# Patient Record
Sex: MALE | Race: WHITE | NOT HISPANIC OR LATINO | Employment: FULL TIME | URBAN - METROPOLITAN AREA
[De-identification: names, ages, dates, MRNs, and addresses within clinical notes are randomized per-mention and may not be internally consistent; named-entity substitution may affect disease eponyms.]

---

## 2017-04-25 DIAGNOSIS — F51.01 PRIMARY INSOMNIA: ICD-10-CM

## 2017-04-25 NOTE — TELEPHONE ENCOUNTER
CLONIDINE 0.3MG      Last Written Prescription Date: 11/21/2016  Last Fill Quantity: 30, # refills: 5  Last Office Visit with [facility], [facility] or [facility] prescribing provider: 7/5/2016       Potassium   Date Value Ref Range Status   12/04/2009 4.6 3.4 - 5.3 mmol/L Final     Creatinine   Date Value Ref Range Status   12/04/2009 0.68 0.39 - 0.73 mg/dL Final     Comment:     New IDMS-traceable calibration  beginning 5/1/08     BP Readings from Last 3 Encounters:   07/05/16 112/73   10/05/12 121/67   06/29/12 120/76

## 2017-04-26 RX ORDER — CLONIDINE HYDROCHLORIDE 0.3 MG/1
TABLET ORAL
Qty: 30 TABLET | Refills: 2 | Status: SHIPPED | OUTPATIENT
Start: 2017-04-26 | End: 2017-07-24

## 2017-04-26 NOTE — TELEPHONE ENCOUNTER
Prescription approved per [facility] Refill Protocol.  Due for annual visit in July.  Christina Gil, RN  Triage Nurse

## 2017-08-23 DIAGNOSIS — F51.01 PRIMARY INSOMNIA: ICD-10-CM

## 2017-08-23 NOTE — TELEPHONE ENCOUNTER
cloNIDine (CATAPRES) 0.3 MG tablet      Last Written Prescription Date: 7/27/2017  Last Fill Quantity: 30, # refills: 0  Last Office Visit with [facility], P or [facility] prescribing provider: 7/5/2016       Potassium   Date Value Ref Range Status   12/04/2009 4.6 3.4 - 5.3 mmol/L Final     Creatinine   Date Value Ref Range Status   12/04/2009 0.68 0.39 - 0.73 mg/dL Final     Comment:     New IDMS-traceable calibration  beginning 5/1/08     BP Readings from Last 3 Encounters:   07/05/16 112/73   10/05/12 121/67   06/29/12 120/76

## 2017-08-24 RX ORDER — CLONIDINE HYDROCHLORIDE 0.3 MG/1
TABLET ORAL
Qty: 30 TABLET | Refills: 0 | Status: SHIPPED | OUTPATIENT
Start: 2017-08-24 | End: 2017-09-20

## 2017-08-24 NOTE — TELEPHONE ENCOUNTER
Routing refill request to provider for review/approval because:  Tisha given x1 and patient did not follow up, please advise  Labs not current:  creatinine  Patient needs to be seen because it has been more than 1 year since last office visit.    Yuko Patel RN

## 2017-09-20 DIAGNOSIS — F51.01 PRIMARY INSOMNIA: ICD-10-CM

## 2017-09-21 NOTE — TELEPHONE ENCOUNTER
cloNIDine (CATAPRES) 0.3 MG tablet      Last Written Prescription Date: 8/24/2017  Last Fill Quantity: 30, # refills: 0  Last Office Visit with G, P or [facility] prescribing provider: 7/5/2016       Potassium   Date Value Ref Range Status   12/04/2009 4.6 3.4 - 5.3 mmol/L Final     Creatinine   Date Value Ref Range Status   12/04/2009 0.68 0.39 - 0.73 mg/dL Final     Comment:     New IDMS-traceable calibration  beginning 5/1/08     BP Readings from Last 3 Encounters:   07/05/16 112/73   10/05/12 121/67   06/29/12 120/76

## 2017-09-22 RX ORDER — CLONIDINE HYDROCHLORIDE 0.3 MG/1
TABLET ORAL
Qty: 14 TABLET | Refills: 0 | Status: SHIPPED | OUTPATIENT
Start: 2017-09-22 | End: 2017-09-26

## 2017-09-22 NOTE — TELEPHONE ENCOUNTER
Routing to provider - patient was given one month laura RX last month. No follow up scheduled as yet.    Milly Jacobson, MSN, RN-BC  Care Coordinator

## 2017-09-22 NOTE — TELEPHONE ENCOUNTER
LM for patient to call the clinic.  Please see below documentation.    Thank you,    Jenny Pickering

## 2017-09-22 NOTE — TELEPHONE ENCOUNTER
Patient given 2 week supply. Needs appointment with Dr. Dong.   Please call and inform. And schedule!   Alexanrda Guerrero PA-C

## 2017-09-26 ENCOUNTER — OFFICE VISIT (OUTPATIENT)
Dept: PEDIATRICS | Facility: CLINIC | Age: 22
End: 2017-09-26
Payer: COMMERCIAL

## 2017-09-26 VITALS
DIASTOLIC BLOOD PRESSURE: 62 MMHG | BODY MASS INDEX: 18.52 KG/M2 | TEMPERATURE: 98.6 F | SYSTOLIC BLOOD PRESSURE: 128 MMHG | OXYGEN SATURATION: 97 % | HEART RATE: 66 BPM | HEIGHT: 67 IN | WEIGHT: 118 LBS

## 2017-09-26 DIAGNOSIS — F90.2 ADHD (ATTENTION DEFICIT HYPERACTIVITY DISORDER), COMBINED TYPE: ICD-10-CM

## 2017-09-26 DIAGNOSIS — Z23 NEED FOR TDAP VACCINATION: ICD-10-CM

## 2017-09-26 DIAGNOSIS — F51.01 PRIMARY INSOMNIA: Primary | ICD-10-CM

## 2017-09-26 DIAGNOSIS — Z23 NEED FOR PROPHYLACTIC VACCINATION AND INOCULATION AGAINST INFLUENZA: ICD-10-CM

## 2017-09-26 PROCEDURE — 90715 TDAP VACCINE 7 YRS/> IM: CPT | Performed by: INTERNAL MEDICINE

## 2017-09-26 PROCEDURE — 90471 IMMUNIZATION ADMIN: CPT | Performed by: INTERNAL MEDICINE

## 2017-09-26 PROCEDURE — 90686 IIV4 VACC NO PRSV 0.5 ML IM: CPT | Performed by: INTERNAL MEDICINE

## 2017-09-26 PROCEDURE — 99213 OFFICE O/P EST LOW 20 MIN: CPT | Mod: 25 | Performed by: INTERNAL MEDICINE

## 2017-09-26 PROCEDURE — 90472 IMMUNIZATION ADMIN EACH ADD: CPT | Performed by: INTERNAL MEDICINE

## 2017-09-26 RX ORDER — CLONIDINE HYDROCHLORIDE 0.3 MG/1
0.3 TABLET ORAL DAILY
Qty: 30 TABLET | Refills: 11 | Status: SHIPPED | OUTPATIENT
Start: 2017-09-26 | End: 2018-08-17

## 2017-09-26 NOTE — MR AVS SNAPSHOT
"              After Visit Summary   9/26/2017    Alo Gallegos    MRN: [id number]           Patient Information     Date Of Birth          1995        Visit Information        Provider Department      9/26/2017 4:10 PM Hernan Dong MD [facility]        Today's Diagnoses     Primary insomnia    -  1    ADHD (attention deficit hyperactivity disorder), combined type        Need for Tdap vaccination        Need for prophylactic vaccination and inoculation against influenza          Care Instructions    1) Refilled the clonidine for 1 year    2) Flu and tetanus vaccines today    Let me know if other issues or concerns come up.    Hernan Dong MD          Follow-ups after your visit        Who to contact     If you have questions or need follow up information about today's clinic visit or your schedule please contact [facility] directly at [phone].  Normal or non-critical lab and imaging results will be communicated to you by MyChart, letter or phone within 4 business days after the clinic has received the results. If you do not hear from us within 7 days, please contact the clinic through MyChart or phone. If you have a critical or abnormal lab result, we will notify you by phone as soon as possible.  Submit refill requests through Mettl or call your pharmacy and they will forward the refill request to us. Please allow 3 business days for your refill to be completed.          Additional Information About Your Visit        Y-Klubhart Information     Mettl lets you send messages to your doctor, view your test results, renew your prescriptions, schedule appointments and more. To sign up, go to [email or web address] . Click on \"Log in\" on the left side of the screen, which will take you to the Welcome page. Then click on \"Sign up Now\" on the right side of the page.     You will be asked to enter the access code listed below, as well as some personal information. Please follow the " "directions to create your username and password.     Your access code is: [email or web address]  Expires: 2017  4:26 PM     Your access code will  in 90 days. If you need help or a new code, please call your [place] clinic or [phone].        Care EveryWhere ID     This is your Care EveryWhere ID. This could be used by other organizations to access your [place] medical records  [id number]        Your Vitals Were     Pulse Temperature Height Pulse Oximetry BMI (Body Mass Index)       66 98.6  F (37  C) (Oral) 5' 6.5\" (1.689 m) 97% 18.76 kg/m2        Blood Pressure from Last 3 Encounters:   17 128/62   16 112/73   10/05/12 121/67    Weight from Last 3 Encounters:   17 118 lb (53.5 kg)   16 119 lb (54 kg)   10/05/12 129 lb (58.5 kg) (23 %)*     * Growth percentiles are based on [facility] 2-20 Years data.              We Performed the Following     ADMIN 1st VACCINE     FLU VAC, SPLIT VIRUS IM > 3 YO (QUADRIVALENT) [31865]     TDAP VACCINE (ADACEL)     Vaccine Administration, Each Additional [23023]          Today's Medication Changes          These changes are accurate as of: 17  4:26 PM.  If you have any questions, ask your nurse or doctor.               These medicines have changed or have updated prescriptions.        Dose/Directions    cloNIDine 0.3 MG tablet   Commonly known as:  CATAPRES   This may have changed:  See the new instructions.   Used for:  Primary insomnia, ADHD (attention deficit hyperactivity disorder), combined type   Changed by:  Hernan Dong MD        Dose:  0.3 mg   Take 1 tablet (0.3 mg) by mouth daily   Quantity:  30 tablet   Refills:  11            Where to get your medicines      These medications were sent to [facility] - [place], [place] - [address] SEC OF  [address], [facility] [address]     Phone:  [phone]     cloNIDine 0.3 MG tablet                Primary Care Provider Office " Phone # Fax #    Hernan Dong [phone] [address] [place] [address]        Equal Access to Services     [facility] MARINO : Hadii aad ku hadprashantsaúl Morenitaernie, walaloda carlosaman, qasalbadorta kadharada nabil, michael lulavelino tian lamirianjesús eleazar. So [facility] [phone].    ATENCIÓN: Si habla español, tiene a may disposición servicios gratuitos de asistencia lingüística. Llame al [phone].    We comply with applicable federal civil rights laws and [place] laws. We do not discriminate on the basis of race, color, national origin, age, disability sex, sexual orientation or gender identity.            Thank you!     Thank you for choosing [facility]  for your care. Our goal is always to provide you with excellent care. Hearing back from our patients is one way we can continue to improve our services. Please take a few minutes to complete the written survey that you may receive in the mail after your visit with us. Thank you!             Your Updated Medication List - Protect others around you: Learn how to safely use, store and throw away your medicines at www.disposemymeds.org.          This list is accurate as of: 9/26/17  4:26 PM.  Always use your most recent med list.                   Brand Name Dispense Instructions for use Diagnosis    cloNIDine 0.3 MG tablet    CATAPRES    30 tablet    Take 1 tablet (0.3 mg) by mouth daily    Primary insomnia, ADHD (attention deficit hyperactivity disorder), combined type       MELATONIN      5 mg daily

## 2017-09-26 NOTE — PROGRESS NOTES
"  SUBJECTIVE:   Alo Gallegos is a 22 year old male who presents to clinic today for the following health issues:      Follow up from previous office visit     Recheck on the clonidine: has been taking every night and feels that helps well with sleeping and helps him be able to clear his mind and sleep. Feels well rested after sleeping. Mood doing well without concerns for anxiety or depression.     Helps with ADD. Working as a  right now. Feels more stressed with skill level, has been working more overtime. Feels that focus has been going well at work.  He feels that he is able to focus and get things completed. The current dose of clonidine is working well and he has no desires to change it.    No chest pains or pressures. No shortness of breath. No palpitations. No LH or dizziness.     Feels that this has been working well and does not wish to change this.    Problem list and histories reviewed & adjusted, as indicated.  Additional history: as documented    Patient Active Problem List   Diagnosis     Primary insomnia     ADHD (attention deficit hyperactivity disorder), combined type     History reviewed. No pertinent surgical history.    Social History   Substance Use Topics     Smoking status: Never Smoker     Smokeless tobacco: Never Used     Alcohol use Yes      Comment: very little     History reviewed. No pertinent family history.          Reviewed and updated as needed this visit by clinical staff       Reviewed and updated as needed this visit by Provider         ROS:  Constitutional, HEENT, cardiovascular, pulmonary, GI, , musculoskeletal, neuro, skin, endocrine and psych systems are negative, except as otherwise noted.      OBJECTIVE:   /62 (BP Location: Right arm, Cuff Size: Adult Regular)  Pulse 66  Temp 98.6  F (37  C) (Oral)  Ht 5' 6.5\" (1.689 m)  Wt 118 lb (53.5 kg)  SpO2 97%  BMI 18.76 kg/m2  Body mass index is 18.76 kg/(m^2).  GENERAL: healthy, alert and no distress  NECK: no " adenopathy, no asymmetry, masses, or scars and thyroid normal to palpation  RESP: lungs clear to auscultation - no rales, rhonchi or wheezes  CV: regular rate and rhythm, normal S1 S2, no S3 or S4, no murmur, click or rub, no peripheral edema and peripheral pulses strong  MS: no gross musculoskeletal defects noted, no edema  SKIN: no suspicious lesions or rashes  NEURO: Normal strength and tone, mentation intact and speech normal  PSYCH: mentation appears normal, affect flat, judgement and insight intact and poor eye contact    Diagnostic Test Results:  none     ASSESSMENT/PLAN:     1. Primary insomnia  Will continue current therapy, discussed risks and benefits of continuing and also side effects to watch for.   - cloNIDine (CATAPRES) 0.3 MG tablet; Take 1 tablet (0.3 mg) by mouth daily  Dispense: 30 tablet; Refill: 11    2. ADHD (attention deficit hyperactivity disorder), combined type  Will continue current therapy as feels that this is working well. Discussed can wean off this in the future if desired  - cloNIDine (CATAPRES) 0.3 MG tablet; Take 1 tablet (0.3 mg) by mouth daily  Dispense: 30 tablet; Refill: 11    3. Need for Tdap vaccination  - TDAP VACCINE (ADACEL)  - ADMIN 1st VACCINE    4. Need for prophylactic vaccination and inoculation against influenza  - FLU VAC, SPLIT VIRUS IM > 3 YO (QUADRIVALENT) [73181]  - Vaccine Administration, Each Additional [42274]      Patient Instructions   1) Refilled the clonidine for 1 year    2) Flu and tetanus vaccines today    Let me know if other issues or concerns come up.    MD Hernan Hernandez MD, MD  [facility]    Injectable Influenza Immunization Documentation    1.  Is the person to be vaccinated sick today?   No    2. Does the person to be vaccinated have an allergy to a component   of the vaccine?   No    3. Has the person to be vaccinated ever had a serious reaction   to influenza vaccine in the past?   No    4. Has the person to  be vaccinated ever had Guillain-Barré syndrome?   No    Form completed by Chanelle Williamson MA

## 2017-09-26 NOTE — NURSING NOTE
"Chief Complaint   Patient presents with     RECHECK       Initial /62 (BP Location: Right arm, Cuff Size: Adult Regular)  Pulse 66  Temp 98.6  F (37  C) (Oral)  Ht 5' 6.5\" (1.689 m)  Wt 118 lb (53.5 kg)  SpO2 97%  BMI 18.76 kg/m2 Estimated body mass index is 18.76 kg/(m^2) as calculated from the following:    Height as of this encounter: 5' 6.5\" (1.689 m).    Weight as of this encounter: 118 lb (53.5 kg).  Medication Reconciliation: complete   Chanelle Williamson MA    "

## 2017-09-26 NOTE — PATIENT INSTRUCTIONS
1) Refilled the clonidine for 1 year    2) Flu and tetanus vaccines today    Let me know if other issues or concerns come up.    Hernan Dong MD

## 2018-08-17 ENCOUNTER — OFFICE VISIT (OUTPATIENT)
Dept: PEDIATRICS | Facility: CLINIC | Age: 23
End: 2018-08-17
Payer: COMMERCIAL

## 2018-08-17 VITALS
BODY MASS INDEX: 19.38 KG/M2 | WEIGHT: 123.5 LBS | SYSTOLIC BLOOD PRESSURE: 102 MMHG | OXYGEN SATURATION: 99 % | DIASTOLIC BLOOD PRESSURE: 56 MMHG | HEART RATE: 53 BPM | HEIGHT: 67 IN | TEMPERATURE: 96.6 F

## 2018-08-17 DIAGNOSIS — Z00.00 ROUTINE GENERAL MEDICAL EXAMINATION AT A HEALTH CARE FACILITY: Primary | ICD-10-CM

## 2018-08-17 DIAGNOSIS — M25.531 RIGHT WRIST PAIN: ICD-10-CM

## 2018-08-17 DIAGNOSIS — F90.2 ADHD (ATTENTION DEFICIT HYPERACTIVITY DISORDER), COMBINED TYPE: ICD-10-CM

## 2018-08-17 DIAGNOSIS — F51.01 PRIMARY INSOMNIA: ICD-10-CM

## 2018-08-17 PROCEDURE — 99395 PREV VISIT EST AGE 18-39: CPT | Performed by: INTERNAL MEDICINE

## 2018-08-17 RX ORDER — CLONIDINE HYDROCHLORIDE 0.3 MG/1
0.3 TABLET ORAL DAILY
Qty: 30 TABLET | Refills: 11 | Status: SHIPPED | OUTPATIENT
Start: 2018-08-17 | End: 2019-08-23

## 2018-08-17 ASSESSMENT — ENCOUNTER SYMPTOMS
ARTHRALGIAS: 1
FEVER: 0
HEMATOCHEZIA: 0
PALPITATIONS: 0
FREQUENCY: 0
JOINT SWELLING: 0
DIZZINESS: 0
SORE THROAT: 0
COUGH: 1
NAUSEA: 0
ABDOMINAL PAIN: 0
HEADACHES: 0
HEMATURIA: 0
CHILLS: 0
MYALGIAS: 1
DIARRHEA: 0
SHORTNESS OF BREATH: 0
HEARTBURN: 0
WEAKNESS: 0
NERVOUS/ANXIOUS: 1
EYE PAIN: 0
DYSURIA: 0
PARESTHESIAS: 0
CONSTIPATION: 0

## 2018-08-17 NOTE — PROGRESS NOTES
SUBJECTIVE:   CC: Alo Gallegos is an 23 year old male who presents for preventative health visit.     Physical   Annual:     Getting at least 3 servings of Calcium per day:  NO    Bi-annual eye exam:  NO    Dental care twice a year:  Yes    Sleep apnea or symptoms of sleep apnea:  None    Frequency of exercise:  None    Taking medications regularly:  Yes    Medication side effects:  None    Additional concerns today:  No        Musculoskeletal problem/pain      Duration: off and on for 2 months    Description  Location: right wrist    Intensity:  mild, moderate, severe    Accompanying signs and symptoms: none    History  Previous similar problem: no   Previous evaluation:  none    Precipitating or alleviating factors:  Trauma or overuse: YES- new job  Aggravating factors include: lifting and overuse    Therapies tried and outcome: rest/inactivity      Has been using clonidine at night for help with sleep still, working well, no acute issues.     Today's PHQ-2 Score:   PHQ-2 ( 1999 Pfizer) 8/17/2018   Q1: Little interest or pleasure in doing things 1   Q2: Feeling down, depressed or hopeless 0   PHQ-2 Score 1   Q1: Little interest or pleasure in doing things Several days   Q2: Feeling down, depressed or hopeless Not at all   PHQ-2 Score 1       Abuse: Current or Past(Physical, Sexual or Emotional)- No  Do you feel safe in your environment - Yes    Social History   Substance Use Topics     Smoking status: Never Smoker     Smokeless tobacco: Never Used     Alcohol use Yes      Comment: very little     No flowsheet data found.No flowsheet data found.    Last PSA: No results found for: PSA    Reviewed orders with patient. Reviewed health maintenance and updated orders accordingly - Yes  Labs reviewed in EPIC    Reviewed and updated as needed this visit by clinical staff         Reviewed and updated as needed this visit by Provider            Review of Systems   Constitutional: Negative for chills and fever.   HENT:  "Negative for congestion, ear pain, hearing loss and sore throat.    Eyes: Negative for pain and visual disturbance.   Respiratory: Positive for cough. Negative for shortness of breath.    Cardiovascular: Negative for chest pain, palpitations and peripheral edema.   Gastrointestinal: Negative for abdominal pain, constipation, diarrhea, heartburn, hematochezia and nausea.   Genitourinary: Negative for discharge, dysuria, frequency, genital sores, hematuria, impotence and urgency.   Musculoskeletal: Positive for arthralgias and myalgias. Negative for joint swelling.   Skin: Negative for rash.   Neurological: Negative for dizziness, weakness, headaches and paresthesias.   Psychiatric/Behavioral: Negative for mood changes. The patient is nervous/anxious.          OBJECTIVE:   /56 (BP Location: Right arm, Patient Position: Sitting, Cuff Size: Adult Regular)  Pulse 53  Temp 96.6  F (35.9  C) (Tympanic)  Ht 5' 6.5\" (1.689 m)  Wt 123 lb 8 oz (56 kg)  SpO2 99%  BMI 19.63 kg/m2    Physical Exam  GENERAL: healthy, alert and no distress  EYES: Eyes grossly normal to inspection, PERRL and conjunctivae and sclerae normal  HENT: ear canals and TM's normal, nose and mouth without ulcers or lesions  NECK: no adenopathy, no asymmetry, masses, or scars and thyroid normal to palpation  RESP: lungs clear to auscultation - no rales, rhonchi or wheezes  CV: regular rate and rhythm, normal S1 S2, no S3 or S4, no murmur, click or rub, no peripheral edema and peripheral pulses strong  ABDOMEN: soft, nontender, no hepatosplenomegaly, no masses and bowel sounds normal  MS: no gross musculoskeletal defects noted, no edema  SKIN: no suspicious lesions or rashes  NEURO: Normal strength and tone, mentation intact and speech normal    Diagnostic Test Results:  Results for orders placed or performed during the hospital encounter of 10/05/12   Igf binding protein 3   Result Value Ref Range    IGF Binding Protein3 6.1 3.0 - 8.2 ug/mL    IGF " "Binding Protein 3 SD Score 0.4    INS growth factor 1   Result Value Ref Range    Insulin Growth Factor 1 557 184 - 714 ng/mL    Insulin Growth Factor 1 SD Score 0.8        ASSESSMENT/PLAN:       ICD-10-CM    1. Routine general medical examination at a health care facility Z00.00    2. Primary insomnia F51.01 cloNIDine (CATAPRES) 0.3 MG tablet   3. ADHD (attention deficit hyperactivity disorder), combined type F90.2 cloNIDine (CATAPRES) 0.3 MG tablet   4. Right wrist pain M25.531        COUNSELING:   Reviewed preventive health counseling, as reflected in patient instructions    BP Readings from Last 1 Encounters:   09/26/17 128/62     Estimated body mass index is 19.63 kg/(m^2) as calculated from the following:    Height as of this encounter: 5' 6.5\" (1.689 m).    Weight as of this encounter: 123 lb 8 oz (56 kg).      Weight management plan: no acute weight concerns     reports that he has never smoked. He has never used smokeless tobacco.      Counseling Resources:  ATP IV Guidelines  Pooled Cohorts Equation Calculator  FRAX Risk Assessment  ICSI Preventive Guidelines  Dietary Guidelines for Americans, 2010  [employer]'s MyPlate  ASA Prophylaxis  Lung CA Screening    Hernan Dong MD, MD  [facility]  Answers for HPI/ROS submitted by the patient on 8/17/2018   PHQ-2 Score: 1    "

## 2018-08-17 NOTE — MR AVS SNAPSHOT
After Visit Summary   8/17/2018    Alo Gallegos    MRN: [id number]           Patient Information     Date Of Birth          1995        Visit Information        Provider Department      8/17/2018 11:30 AM Hernan Dong MD [facility]        Today's Diagnoses     Primary insomnia        ADHD (attention deficit hyperactivity disorder), combined type          Care Instructions    1) Try a wrist brace if needed, ice as able. Let me know if getting worse. Can try ibuprofen as well for pain.    2) Refilled the clonidine    3) We should check cholesterol in the next several years.     Hernan Dong MD    Preventive Health Recommendations  Male Ages 21 - 25     Yearly exam:             See your health care provider every year in order to  o   Review health changes.   o   Discuss preventive care.    o   Review your medicines if your doctor has prescribed any.    You should be tested each year for STDs (sexually transmitted diseases).     Talk to your provider about cholesterol testing.      If you are at risk for diabetes, you should have a diabetes test (fasting glucose).    Shots: Get a flu shot each year. Get a tetanus shot every 10 years.     Nutrition:    Eat at least 5 servings of fruits and vegetables daily.     Eat whole-grain bread, whole-wheat pasta and brown rice instead of white grains and rice.     Get adequate calcium and Vitamin D.     Lifestyle    Exercise for at least 150 minutes a week (30 minutes a day, 5 days a week). This will help you control your weight and prevent disease.     Limit alcohol to one drink per day.     No smoking.     Wear sunscreen to prevent skin cancer.     See your dentist every six months for an exam and cleaning.             Follow-ups after your visit        Follow-up notes from your care team     Return in about 1 year (around 8/17/2019).      Who to contact     If you have questions or need follow up information about today's clinic visit or  "your schedule please contact [facility] directly at [phone].  Normal or non-critical lab and imaging results will be communicated to you by MyChart, letter or phone within 4 business days after the clinic has received the results. If you do not hear from us within 7 days, please contact the clinic through MyChart or phone. If you have a critical or abnormal lab result, we will notify you by phone as soon as possible.  Submit refill requests through [employer] App or call your pharmacy and they will forward the refill request to us. Please allow 3 business days for your refill to be completed.          Additional Information About Your Visit        Care EveryWhere ID     This is your Care EveryWhere ID. This could be used by other organizations to access your [place] medical records  [id number]        Your Vitals Were     Pulse Temperature Height Pulse Oximetry BMI (Body Mass Index)       53 96.6  F (35.9  C) (Tympanic) 5' 6.5\" (1.689 m) 99% 19.63 kg/m2        Blood Pressure from Last 3 Encounters:   08/17/18 102/56   09/26/17 128/62   07/05/16 112/73    Weight from Last 3 Encounters:   08/17/18 123 lb 8 oz (56 kg)   09/26/17 118 lb (53.5 kg)   07/05/16 119 lb (54 kg)              Today, you had the following     No orders found for display         Where to get your medicines      These medications were sent to [facility] - [facility] [address]  [address], [facility] [address]     Phone:  [phone]     cloNIDine 0.3 MG tablet          Primary Care Provider Office Phone # Fax #    Hernan Dong [phone] [address] [place] [address]        Equal Access to Services     [facility] MARINO : Robert Shook, walaloda luqadaha, qaybta kaalmada nabil, michael bush. So [facility] [phone].    ATENCIÓN: Si habla español, tiene a may disposición " servicios gratuitos de asistencia lingüística. Cristobal miner [phone].    We comply with applicable federal civil rights laws and [place] laws. We do not discriminate on the basis of race, color, national origin, age, disability, sex, sexual orientation, or gender identity.            Thank you!     Thank you for choosing [facility]  for your care. Our goal is always to provide you with excellent care. Hearing back from our patients is one way we can continue to improve our services. Please take a few minutes to complete the written survey that you may receive in the mail after your visit with us. Thank you!             Your Updated Medication List - Protect others around you: Learn how to safely use, store and throw away your medicines at www.disposemymeds.org.          This list is accurate as of 8/17/18 12:05 PM.  Always use your most recent med list.                   Brand Name Dispense Instructions for use Diagnosis    cloNIDine 0.3 MG tablet    CATAPRES    30 tablet    Take 1 tablet (0.3 mg) by mouth daily    Primary insomnia, ADHD (attention deficit hyperactivity disorder), combined type       MELATONIN      5 mg daily

## 2018-08-17 NOTE — PATIENT INSTRUCTIONS
1) Try a wrist brace if needed, ice as able. Let me know if getting worse. Can try ibuprofen as well for pain.    2) Refilled the clonidine    3) We should check cholesterol in the next several years.     Hernan Dong MD    Preventive Health Recommendations  Male Ages 21 - 25     Yearly exam:             See your health care provider every year in order to  o   Review health changes.   o   Discuss preventive care.    o   Review your medicines if your doctor has prescribed any.    You should be tested each year for STDs (sexually transmitted diseases).     Talk to your provider about cholesterol testing.      If you are at risk for diabetes, you should have a diabetes test (fasting glucose).    Shots: Get a flu shot each year. Get a tetanus shot every 10 years.     Nutrition:    Eat at least 5 servings of fruits and vegetables daily.     Eat whole-grain bread, whole-wheat pasta and brown rice instead of white grains and rice.     Get adequate calcium and Vitamin D.     Lifestyle    Exercise for at least 150 minutes a week (30 minutes a day, 5 days a week). This will help you control your weight and prevent disease.     Limit alcohol to one drink per day.     No smoking.     Wear sunscreen to prevent skin cancer.     See your dentist every six months for an exam and cleaning.

## 2019-08-23 ENCOUNTER — OFFICE VISIT (OUTPATIENT)
Dept: PEDIATRICS | Facility: CLINIC | Age: 24
End: 2019-08-23
Payer: COMMERCIAL

## 2019-08-23 VITALS
HEIGHT: 66 IN | TEMPERATURE: 98.3 F | BODY MASS INDEX: 19.27 KG/M2 | WEIGHT: 119.9 LBS | OXYGEN SATURATION: 98 % | HEART RATE: 66 BPM | DIASTOLIC BLOOD PRESSURE: 68 MMHG | SYSTOLIC BLOOD PRESSURE: 104 MMHG

## 2019-08-23 DIAGNOSIS — F51.01 PRIMARY INSOMNIA: ICD-10-CM

## 2019-08-23 DIAGNOSIS — F90.2 ADHD (ATTENTION DEFICIT HYPERACTIVITY DISORDER), COMBINED TYPE: ICD-10-CM

## 2019-08-23 DIAGNOSIS — Z00.00 ROUTINE GENERAL MEDICAL EXAMINATION AT A HEALTH CARE FACILITY: Primary | ICD-10-CM

## 2019-08-23 LAB
CHOLEST SERPL-MCNC: 151 MG/DL
GLUCOSE SERPL-MCNC: 96 MG/DL (ref 70–99)
HDLC SERPL-MCNC: 47 MG/DL
LDLC SERPL CALC-MCNC: 94 MG/DL
NONHDLC SERPL-MCNC: 104 MG/DL
TRIGL SERPL-MCNC: 52 MG/DL

## 2019-08-23 PROCEDURE — 36415 COLL VENOUS BLD VENIPUNCTURE: CPT | Performed by: INTERNAL MEDICINE

## 2019-08-23 PROCEDURE — 80061 LIPID PANEL: CPT | Performed by: INTERNAL MEDICINE

## 2019-08-23 PROCEDURE — 82947 ASSAY GLUCOSE BLOOD QUANT: CPT | Performed by: INTERNAL MEDICINE

## 2019-08-23 PROCEDURE — 99395 PREV VISIT EST AGE 18-39: CPT | Performed by: INTERNAL MEDICINE

## 2019-08-23 RX ORDER — CLONIDINE HYDROCHLORIDE 0.3 MG/1
0.3 TABLET ORAL DAILY
Qty: 30 TABLET | Refills: 11 | Status: SHIPPED | OUTPATIENT
Start: 2019-08-23 | End: 2020-09-16

## 2019-08-23 ASSESSMENT — ENCOUNTER SYMPTOMS
DIARRHEA: 0
HEADACHES: 0
ABDOMINAL PAIN: 0
NAUSEA: 0
FEVER: 0
HEMATOCHEZIA: 0
DYSURIA: 0
CHILLS: 0
NERVOUS/ANXIOUS: 0
PARESTHESIAS: 0
SORE THROAT: 0
CONSTIPATION: 0
HEMATURIA: 0
SHORTNESS OF BREATH: 0
MYALGIAS: 1
WEAKNESS: 0
HEARTBURN: 0
COUGH: 0
DIZZINESS: 0
FREQUENCY: 0
EYE PAIN: 0
ARTHRALGIAS: 0
JOINT SWELLING: 0
PALPITATIONS: 0

## 2019-08-23 ASSESSMENT — MIFFLIN-ST. JEOR: SCORE: 1476.61

## 2019-08-23 NOTE — LETTER
[facility]  [address] [address]                  [phone]   August 23, 2019    Alo Gallegos  [address] [address]      Dear Alo,    Here is a summary of your recent test results:    Your labs looked good without concerning findings.     Your test results are enclosed.      Please contact me if you have any questions.           Thank you very much for choosing [facility]    Best regards,    Hernan Dong MD        Results for orders placed or performed in visit on 08/23/19   Lipid panel reflex to direct LDL Fasting   Result Value Ref Range    Cholesterol 151 <200 mg/dL    Triglycerides 52 <150 mg/dL    HDL Cholesterol 47 >39 mg/dL    LDL Cholesterol Calculated 94 <100 mg/dL    Non HDL Cholesterol 104 <130 mg/dL   Glucose   Result Value Ref Range    Glucose 96 70 - 99 mg/dL

## 2019-08-23 NOTE — PROGRESS NOTES
SUBJECTIVE:   CC: Alo Gallegos is an 24 year old male who presents for preventative health visit.     Healthy Habits:     Getting at least 3 servings of Calcium per day:  NO    Bi-annual eye exam:  NO    Dental care twice a year:  Yes    Sleep apnea or symptoms of sleep apnea:  None    Diet:  Regular (no restrictions)    Frequency of exercise:  None    Duration of exercise:  N/A    Taking medications regularly:  Yes    Barriers to taking medications:  None    Medication side effects:  None    PHQ-2 Total Score: 0    Additional concerns today:  No    Doing well on clonidine at night, feels like still needs. Mood going well. Attention going well. Sleeping well on clonidine.    Today's PHQ-2 Score:   PHQ-2 ( 1999 Pfizer) 8/23/2019   Q1: Little interest or pleasure in doing things 0   Q2: Feeling down, depressed or hopeless 0   PHQ-2 Score 0   Q1: Little interest or pleasure in doing things Not at all   Q2: Feeling down, depressed or hopeless Not at all   PHQ-2 Score 0     Abuse: Current or Past(Physical, Sexual or Emotional)- No  Do you feel safe in your environment? Yes    Social History     Tobacco Use     Smoking status: Never Smoker     Smokeless tobacco: Never Used   Substance Use Topics     Alcohol use: Yes     Comment: very little      If you drink alcohol do you typically have >3 drinks per day or >7 drinks per week? Not applicable    Alcohol Use 8/23/2019   Prescreen: >3 drinks/day or >7 drinks/week? Not Applicable   Prescreen: >3 drinks/day or >7 drinks/week? -       Last PSA: No results found for: PSA    Reviewed orders with patient. Reviewed health maintenance and updated orders accordingly - Yes  Lab work is in process    Reviewed and updated as needed this visit by clinical staff         Reviewed and updated as needed this visit by Provider            Review of Systems   Constitutional: Negative for chills and fever.   HENT: Negative for congestion, ear pain, hearing loss and sore throat.    Eyes:  "Negative for pain and visual disturbance.   Respiratory: Negative for cough and shortness of breath.    Cardiovascular: Negative for chest pain, palpitations and peripheral edema.   Gastrointestinal: Negative for abdominal pain, constipation, diarrhea, heartburn, hematochezia and nausea.   Genitourinary: Negative for discharge, dysuria, frequency, genital sores, hematuria, impotence and urgency.   Musculoskeletal: Positive for myalgias. Negative for arthralgias and joint swelling.   Skin: Negative for rash.   Neurological: Negative for dizziness, weakness, headaches and paresthesias.   Psychiatric/Behavioral: Negative for mood changes. The patient is not nervous/anxious.        OBJECTIVE:   /68 (BP Location: Right arm, Patient Position: Sitting, Cuff Size: Adult Regular)   Pulse 66   Temp 98.3  F (36.8  C) (Oral)   Ht 1.676 m (5' 6\")   Wt 54.4 kg (119 lb 14.4 oz)   SpO2 98%   BMI 19.35 kg/m      Physical Exam  GENERAL: healthy, alert and no distress  EYES: Eyes grossly normal to inspection, PERRL and conjunctivae and sclerae normal  HENT: ear canals and TM's normal, nose and mouth without ulcers or lesions  NECK: no adenopathy, no asymmetry, masses, or scars and thyroid normal to palpation  RESP: lungs clear to auscultation - no rales, rhonchi or wheezes  CV: regular rate and rhythm, normal S1 S2, no S3 or S4, no murmur, click or rub, no peripheral edema and peripheral pulses strong  ABDOMEN: soft, nontender, no hepatosplenomegaly, no masses and bowel sounds normal  MS: no gross musculoskeletal defects noted, no edema  SKIN: no suspicious lesions or rashes  NEURO: Normal strength and tone, mentation intact and speech normal  PSYCH: mentation appears normal, affect normal/bright    Diagnostic Test Results:  Labs reviewed in Epic    ASSESSMENT/PLAN:       ICD-10-CM    1. Routine general medical examination at a health care facility Z00.00 Lipid panel reflex to direct LDL Fasting     Glucose   2. Primary " "insomnia F51.01 cloNIDine (CATAPRES) 0.3 MG tablet   3. ADHD (attention deficit hyperactivity disorder), combined type F90.2 cloNIDine (CATAPRES) 0.3 MG tablet       COUNSELING:   Reviewed preventive health counseling, as reflected in patient instructions    Estimated body mass index is 19.63 kg/m  as calculated from the following:    Height as of 8/17/18: 1.689 m (5' 6.5\").    Weight as of 8/17/18: 56 kg (123 lb 8 oz).          reports that he has never smoked. He has never used smokeless tobacco.      Counseling Resources:  ATP IV Guidelines  Pooled Cohorts Equation Calculator  FRAX Risk Assessment  ICSI Preventive Guidelines  Dietary Guidelines for Americans, 2010  USDA's MyPlate  ASA Prophylaxis  Lung CA Screening    Hernan Dong MD  [facility] [place]  "

## 2019-08-23 NOTE — PATIENT INSTRUCTIONS
1) Labs today as we discussed    2) Sent in clonidine for you as well    3) Can try vitamin D 1000 international units per day if muscle aches are worse.    Hernan Dong MD    Preventive Health Recommendations  Male Ages 21 - 25     Yearly exam:             See your health care provider every year in order to  o   Review health changes.   o   Discuss preventive care.    o   Review your medicines if your doctor has prescribed any.    You should be tested each year for STDs (sexually transmitted diseases).     Talk to your provider about cholesterol testing.      If you are at risk for diabetes, you should have a diabetes test (fasting glucose).    Shots: Get a flu shot each year. Get a tetanus shot every 10 years.     Nutrition:    Eat at least 5 servings of fruits and vegetables daily.     Eat whole-grain bread, whole-wheat pasta and brown rice instead of white grains and rice.     Get adequate calcium and Vitamin D.     Lifestyle    Exercise for at least 150 minutes a week (30 minutes a day, 5 days a week). This will help you control your weight and prevent disease.     Limit alcohol to one drink per day.     No smoking.     Wear sunscreen to prevent skin cancer.     See your dentist every six months for an exam and cleaning.

## 2020-08-20 NOTE — PROGRESS NOTES
SUBJECTIVE:   CC: Alo Gallegos is an 25 year old male who presents for preventative health visit.     Healthy Habits:    Getting at least 3 servings of Calcium per day:  Yes    Bi-annual eye exam:  Yes    Dental care twice a year:  Yes    Sleep apnea or symptoms of sleep apnea:  None    Diet:  Regular (no restrictions)    Frequency of exercise:  4-5 days/week    Duration of exercise:  Greater than 60 minutes    Taking medications regularly:  Yes    Barriers to taking medications:  None    Medication side effects:  None    PHQ-2 Total Score:    Additional concerns today:  No    Has been on clonidine for sleep for years  Previously prescribed by pediatrician before establishing at our clinic with Dr. Letitia Tadeo ADHD  Was medicated as a child, no longer  Works in tree trim[employer]  Active job  Denies chest pain or palpitations  Sexually active with only 1 partner  Declines STD screening    Had normal fasting labs August 2019        Today's PHQ-2 Score:   PHQ-2 ( 1999 Pfizer) 8/23/2019   Q1: Little interest or pleasure in doing things 0   Q2: Feeling down, depressed or hopeless 0   PHQ-2 Score 0   Q1: Little interest or pleasure in doing things Not at all   Q2: Feeling down, depressed or hopeless Not at all   PHQ-2 Score 0       Abuse: Current or Past(Physical, Sexual or Emotional)- No  Do you feel safe in your environment? Yes        Social History     Tobacco Use     Smoking status: Never Smoker     Smokeless tobacco: Never Used   Substance Use Topics     Alcohol use: Yes     Comment: very little     If you drink alcohol do you typically have >3 drinks per day or >7 drinks per week? No    Alcohol Use 8/23/2019   Prescreen: >3 drinks/day or >7 drinks/week? Not Applicable   Prescreen: >3 drinks/day or >7 drinks/week? -       Last PSA: No results found for: PSA    Reviewed orders with patient. Reviewed health maintenance and updated orders accordingly - Yes  Lab work is in process  Labs reviewed in EPIC  BP Readings from  Last 3 Encounters:   08/21/20 108/60   08/23/19 104/68   08/17/18 102/56    Wt Readings from Last 3 Encounters:   08/21/20 57.5 kg (126 lb 11.2 oz)   08/23/19 54.4 kg (119 lb 14.4 oz)   08/17/18 56 kg (123 lb 8 oz)                  Patient Active Problem List   Diagnosis     Primary insomnia     ADHD (attention deficit hyperactivity disorder), combined type     No past surgical history on file.    Social History     Tobacco Use     Smoking status: Never Smoker     Smokeless tobacco: Never Used   Substance Use Topics     Alcohol use: Yes     Frequency: Monthly or less     Drinks per session: 1 or 2     Comment: very little     Family History   Problem Relation Age of Onset     Colon Cancer No family hx of      Prostate Cancer No family hx of          Current Outpatient Medications   Medication Sig Dispense Refill     cloNIDine (CATAPRES) 0.3 MG tablet Take 1 tablet (0.3 mg) by mouth daily 30 tablet 11     MELATONIN 5 mg daily          Reviewed and updated as needed this visit by clinical staff         Reviewed and updated as needed this visit by Provider        Past Medical History:   Diagnosis Date     Asperger's disorder         Review of Systems  CONSTITUTIONAL: NEGATIVE for fever, chills, change in weight  INTEGUMENTARY/SKIN: NEGATIVE for worrisome rashes, moles or lesions  EYES: NEGATIVE for vision changes or irritation  ENT: NEGATIVE for ear, mouth and throat problems  RESP: NEGATIVE for significant cough or SOB  CV: NEGATIVE for chest pain, palpitations or peripheral edema  GI: NEGATIVE for nausea, abdominal pain, heartburn, or change in bowel habits   male: negative for dysuria, hematuria, decreased urinary stream, erectile dysfunction, urethral discharge  MUSCULOSKELETAL: NEGATIVE for significant arthralgias or myalgia  NEURO: NEGATIVE for weakness, dizziness or paresthesias  PSYCHIATRIC: NEGATIVE for changes in mood or affect    OBJECTIVE:   /60 (BP Location: Right arm, Patient Position: Chair, Cuff  "Size: Adult Regular)   Pulse 57   Temp 98.5  F (36.9  C) (Oral)   Resp 14   Ht 1.676 m (5' 6\")   Wt 57.5 kg (126 lb 11.2 oz)   SpO2 97%   BMI 20.45 kg/m      Physical Exam  GENERAL: healthy, alert and no distress  EYES: Eyes grossly normal to inspection, PERRL and conjunctivae and sclerae normal  HENT: ear canals and TM's normal, nose and mouth without ulcers or lesions  NECK: no adenopathy, no asymmetry, masses, or scars and thyroid normal to palpation  RESP: lungs clear to auscultation - no rales, rhonchi or wheezes  CV: regular rate and rhythm, normal S1 S2, no S3 or S4, no murmur, click or rub, no peripheral edema and peripheral pulses strong  ABDOMEN: soft, nontender, no hepatosplenomegaly, no masses and bowel sounds normal  MS: no gross musculoskeletal defects noted, no edema  SKIN: no suspicious lesions or rashes  NEURO: Normal strength and tone, mentation intact and speech normal  PSYCH: mentation appears normal, affect normal/bright    Diagnostic Test Results:  Labs reviewed in Epic    ASSESSMENT/PLAN:       ICD-10-CM    1. Routine general medical examination at a health care facility  Z00.00    2. Primary insomnia  F51.01    3. ADHD (attention deficit hyperactivity disorder), combined type  F90.2        COUNSELING:   Reviewed preventive health counseling, as reflected in patient instructions       Regular exercise       Healthy diet/nutrition       Immunizations    Vaccinated for: Hepatitis B          Estimated body mass index is 19.35 kg/m  as calculated from the following:    Height as of 8/23/19: 1.676 m (5' 6\").    Weight as of 8/23/19: 54.4 kg (119 lb 14.4 oz).          reports that he has never smoked. He has never used smokeless tobacco.      Counseling Resources:  ATP IV Guidelines  Pooled Cohorts Equation Calculator  FRAX Risk Assessment  ICSI Preventive Guidelines  Dietary Guidelines for Americans, 2010  USDA's MyPlate  ASA Prophylaxis  Lung CA Screening    Kristen Davis, APRN " [facility] [place]

## 2020-08-21 ENCOUNTER — OFFICE VISIT (OUTPATIENT)
Dept: PEDIATRICS | Facility: CLINIC | Age: 25
End: 2020-08-21
Payer: COMMERCIAL

## 2020-08-21 VITALS
HEART RATE: 57 BPM | DIASTOLIC BLOOD PRESSURE: 60 MMHG | RESPIRATION RATE: 14 BRPM | BODY MASS INDEX: 20.36 KG/M2 | HEIGHT: 66 IN | TEMPERATURE: 98.5 F | WEIGHT: 126.7 LBS | OXYGEN SATURATION: 97 % | SYSTOLIC BLOOD PRESSURE: 108 MMHG

## 2020-08-21 DIAGNOSIS — F51.01 PRIMARY INSOMNIA: ICD-10-CM

## 2020-08-21 DIAGNOSIS — Z00.00 ROUTINE GENERAL MEDICAL EXAMINATION AT A HEALTH CARE FACILITY: Primary | ICD-10-CM

## 2020-08-21 DIAGNOSIS — F90.2 ADHD (ATTENTION DEFICIT HYPERACTIVITY DISORDER), COMBINED TYPE: ICD-10-CM

## 2020-08-21 PROCEDURE — 99395 PREV VISIT EST AGE 18-39: CPT | Mod: 25 | Performed by: NURSE PRACTITIONER

## 2020-08-21 PROCEDURE — 90471 IMMUNIZATION ADMIN: CPT | Performed by: NURSE PRACTITIONER

## 2020-08-21 PROCEDURE — 90746 HEPB VACCINE 3 DOSE ADULT IM: CPT | Performed by: NURSE PRACTITIONER

## 2020-08-21 SDOH — HEALTH STABILITY: MENTAL HEALTH: HOW OFTEN DO YOU HAVE A DRINK CONTAINING ALCOHOL?: MONTHLY OR LESS

## 2020-08-21 SDOH — HEALTH STABILITY: MENTAL HEALTH: HOW MANY STANDARD DRINKS CONTAINING ALCOHOL DO YOU HAVE ON A TYPICAL DAY?: 1 OR 2

## 2020-08-21 ASSESSMENT — MIFFLIN-ST. JEOR: SCORE: 1502.46

## 2020-09-15 DIAGNOSIS — F51.01 PRIMARY INSOMNIA: ICD-10-CM

## 2020-09-15 DIAGNOSIS — F90.2 ADHD (ATTENTION DEFICIT HYPERACTIVITY DISORDER), COMBINED TYPE: ICD-10-CM

## 2020-09-16 RX ORDER — CLONIDINE HYDROCHLORIDE 0.3 MG/1
0.3 TABLET ORAL DAILY
Qty: 30 TABLET | Refills: 0 | Status: SHIPPED | OUTPATIENT
Start: 2020-09-16 | End: 2020-10-15

## 2020-09-16 NOTE — TELEPHONE ENCOUNTER
Routing refill request to provider for review/approval because:  Labs not current:  creatinine  Patient needs to be seen because it has been more than 1 year since last office visit.    Junie Coelho RN

## 2020-10-14 ENCOUNTER — TELEPHONE (OUTPATIENT)
Dept: PEDIATRICS | Facility: CLINIC | Age: 25
End: 2020-10-14

## 2020-10-14 DIAGNOSIS — F51.01 PRIMARY INSOMNIA: ICD-10-CM

## 2020-10-14 DIAGNOSIS — F90.2 ADHD (ATTENTION DEFICIT HYPERACTIVITY DISORDER), COMBINED TYPE: ICD-10-CM

## 2020-10-14 NOTE — TELEPHONE ENCOUNTER
Reason for Call:  Other prescription    Detailed comments: Patient is calling in regards to his clonidine medication. He says that it was supposed to be refilled for a year's worth, but it was only filled for one month's worth. He is calling to request the next 11 months refills please. He would also like someone to please contact him once this has been placed. Thank you.    Phone Number Patient can be reached at: Cell number on file:    Telephone Information:   Mobile [phone]     Best Time: Anytime    Can we leave a detailed message on this number? YES    Call taken on 10/14/2020 at 6:36 PM by Bishop Ortega

## 2020-10-15 RX ORDER — CLONIDINE HYDROCHLORIDE 0.3 MG/1
0.3 TABLET ORAL DAILY
Qty: 30 TABLET | Refills: 11 | Status: SHIPPED | OUTPATIENT
Start: 2020-10-15 | End: 2021-09-07

## 2020-10-15 NOTE — TELEPHONE ENCOUNTER
The pt is aware of the providers message and has no further questions at this time.  Gina Avitia on 10/15/2020 at 11:35 AM

## 2021-09-07 ENCOUNTER — OFFICE VISIT (OUTPATIENT)
Dept: PEDIATRICS | Facility: CLINIC | Age: 26
End: 2021-09-07
Payer: COMMERCIAL

## 2021-09-07 VITALS
TEMPERATURE: 98.3 F | DIASTOLIC BLOOD PRESSURE: 60 MMHG | BODY MASS INDEX: 20.2 KG/M2 | SYSTOLIC BLOOD PRESSURE: 110 MMHG | HEART RATE: 56 BPM | RESPIRATION RATE: 20 BRPM | WEIGHT: 125.7 LBS | HEIGHT: 66 IN

## 2021-09-07 DIAGNOSIS — F51.01 PRIMARY INSOMNIA: ICD-10-CM

## 2021-09-07 DIAGNOSIS — Z00.00 ROUTINE GENERAL MEDICAL EXAMINATION AT A HEALTH CARE FACILITY: Primary | ICD-10-CM

## 2021-09-07 DIAGNOSIS — F90.2 ADHD (ATTENTION DEFICIT HYPERACTIVITY DISORDER), COMBINED TYPE: ICD-10-CM

## 2021-09-07 DIAGNOSIS — Z83.2 FAMILY HISTORY OF FACTOR V DEFICIENCY: ICD-10-CM

## 2021-09-07 LAB
ANION GAP SERPL CALCULATED.3IONS-SCNC: 4 MMOL/L (ref 3–14)
BUN SERPL-MCNC: 12 MG/DL (ref 7–30)
CALCIUM SERPL-MCNC: 9 MG/DL (ref 8.5–10.1)
CHLORIDE BLD-SCNC: 106 MMOL/L (ref 94–109)
CHOLEST SERPL-MCNC: 158 MG/DL
CO2 SERPL-SCNC: 27 MMOL/L (ref 20–32)
CREAT SERPL-MCNC: 1.14 MG/DL (ref 0.66–1.25)
FACTOR V INTERPRETATION: ABNORMAL
FASTING STATUS PATIENT QL REPORTED: YES
GFR SERPL CREATININE-BSD FRML MDRD: 88 ML/MIN/1.73M2
GLUCOSE BLD-MCNC: 97 MG/DL (ref 70–99)
HDLC SERPL-MCNC: 45 MG/DL
LAB DIRECTOR COMMENTS: ABNORMAL
LAB DIRECTOR DISCLAIMER: ABNORMAL
LAB DIRECTOR INTERPRETATION: ABNORMAL
LAB DIRECTOR METHODOLOGY: ABNORMAL
LAB DIRECTOR RESULTS: ABNORMAL
LDLC SERPL CALC-MCNC: 97 MG/DL
MDL NUMBER: ABNORMAL
NONHDLC SERPL-MCNC: 113 MG/DL
POTASSIUM BLD-SCNC: 4.4 MMOL/L (ref 3.4–5.3)
SODIUM SERPL-SCNC: 137 MMOL/L (ref 133–144)
SPECIMEN DESCRIPTION: ABNORMAL
TRIGL SERPL-MCNC: 81 MG/DL

## 2021-09-07 PROCEDURE — 99395 PREV VISIT EST AGE 18-39: CPT | Mod: 25 | Performed by: NURSE PRACTITIONER

## 2021-09-07 PROCEDURE — 36415 COLL VENOUS BLD VENIPUNCTURE: CPT | Performed by: NURSE PRACTITIONER

## 2021-09-07 PROCEDURE — 90686 IIV4 VACC NO PRSV 0.5 ML IM: CPT | Performed by: NURSE PRACTITIONER

## 2021-09-07 PROCEDURE — 80061 LIPID PANEL: CPT | Performed by: NURSE PRACTITIONER

## 2021-09-07 PROCEDURE — 81241 F5 GENE: CPT | Performed by: NURSE PRACTITIONER

## 2021-09-07 PROCEDURE — 80048 BASIC METABOLIC PNL TOTAL CA: CPT | Performed by: NURSE PRACTITIONER

## 2021-09-07 PROCEDURE — 90471 IMMUNIZATION ADMIN: CPT | Performed by: NURSE PRACTITIONER

## 2021-09-07 PROCEDURE — G0452 MOLECULAR PATHOLOGY INTERPR: HCPCS | Performed by: PATHOLOGY

## 2021-09-07 RX ORDER — CLONIDINE HYDROCHLORIDE 0.3 MG/1
0.3 TABLET ORAL DAILY
Qty: 30 TABLET | Refills: 11 | Status: SHIPPED | OUTPATIENT
Start: 2021-09-07 | End: 2022-09-23

## 2021-09-07 ASSESSMENT — ENCOUNTER SYMPTOMS
COUGH: 0
SHORTNESS OF BREATH: 0
HEADACHES: 0
FREQUENCY: 0
PALPITATIONS: 0
ARTHRALGIAS: 1
NAUSEA: 0
HEMATOCHEZIA: 0
EYE PAIN: 0
MYALGIAS: 1
DIZZINESS: 0
HEARTBURN: 0
PARESTHESIAS: 0
HEMATURIA: 0
DYSURIA: 0
CONSTIPATION: 0
DIARRHEA: 0
NERVOUS/ANXIOUS: 0
JOINT SWELLING: 0
ABDOMINAL PAIN: 0
CHILLS: 0
SORE THROAT: 0
FEVER: 0
WEAKNESS: 0

## 2021-09-07 ASSESSMENT — MIFFLIN-ST. JEOR: SCORE: 1498.3

## 2021-09-07 NOTE — LETTER
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 That means you have one copy of the gene. That does carry a slight increased risk of blood clot, but you do not have other risk factors such as obesity, smoking history, etc. Approximately 5% of the population is heterozygous for Factor V.   It is important to know this, but no treatment is needed for this.     Your cholesterol looks great!     Glucose is normal.     Kristen Davis CNP           Resulted Orders   Basic metabolic panel  (Ca, Cl, CO2, Creat, Gluc, K, Na, BUN)   Result Value Ref Range    Sodium 137 133 - 144 mmol/L    Potassium 4.4 3.4 - 5.3 mmol/L    Chloride 106 94 - 109 mmol/L    Carbon Dioxide (CO2) 27 20 - 32 mmol/L    Anion Gap 4 3 - 14 mmol/L    Urea Nitrogen 12 7 - 30 mg/dL    Creatinine 1.14 0.66 - 1.25 mg/dL    Calcium 9.0 8.5 - 10.1 mg/dL    Glucose 97 70 - 99 mg/dL    GFR Estimate 88 >60 mL/min/1.73m2      Comment:      As of July 11, 2021, eGFR is calculated by the CKD-EPI creatinine equation, without race adjustment. eGFR can be influenced by muscle mass, exercise, and diet. The reported eGFR is an estimation only and is only applicable if the renal function is stable.   Lipid Profile (Chol, Trig, HDL, LDL calc)   Result Value Ref Range    Cholesterol 158 <200 mg/dL      Comment:      Age 0-19 years  Desirable: <170 mg/dL  Borderline high:  170-199 mg/dl  High:            >199 mg/dl    Age 20 years and older  Desirable: <200 mg/dL    Triglycerides 81 <150 mg/dL      Comment:      0-9 years:  Normal:    Less than 75 mg/dL  Borderline high:  75-99 mg/dL  High:             Greater than or equal to 100 mg/dL    0-19 years:  Normal:    Less than 90 mg/dL  Borderline high:   mg/dL  High:             Greater than or equal to 130 mg/dL    20 years and older:  Normal:    Less than 150 mg/dL  Borderline high:  150-199 mg/dL  High:              200-499 mg/dL  Very high:   Greater than or equal to 500 mg/dL    Direct Measure HDL 45 >=40 mg/dL      Comment:      0-19 years:       Greater than or equal to 45 mg/dL   Low: Less than 40 mg/dL   Borderline low: 40-44 mg/dL     20 years and older:   Female: Greater than or equal to 50 mg/dL   Male:   Greater than or equal to 40 mg/dL         LDL Cholesterol Calculated 97 <=100 mg/dL      Comment:      Age 0-19 years:  Desirable: 0-110 mg/dL   Borderline high: 110-129 mg/dL   High: >= 130 mg/dL    Age 20 years and older:  Desirable: <100mg/dL  Above desirable: 100-129 mg/dL   Borderline high: 130-159 mg/dL   High: 160-189 mg/dL   Very high: >= 190 mg/dL    Non HDL Cholesterol 113 <130 mg/dL      Comment:      0-19 years:  Desirable:          Less than 120 mg/dL  Borderline high:   120-144 mg/dL  High:                   Greater than or equal to 145 mg/dL    20 years and older:  Desirable:          130 mg/dL  Above Desirable: 130-159 mg/dL  Borderline high:   160-189 mg/dL  High:               190-219 mg/dL  Very high:     Greater than or equal to 220 mg/dL    Patient Fasting > 8hrs? Yes    Factor 5 leiden mutation analysis   Result Value Ref Range    RESULTS         Factor V 1691G>A (Leiden)  RESULTS:  Mutation analyzed: 1691G>A  Factor V 1691G>A (Leiden)  Interpretation:  PRESENT  Factor V 1691G>A (Leiden) mutation  genotype:  G/A        FACTOR V INTERPRETATION (A)      Factor V 1691G>A (Leiden)  Interpretation:  PRESENT      METHODOLOGY       The regions of genomic DNA containing the F5 gene mutation R506Q (1691G>A) and the Factor 2 (Prothrombin [id number]) gene mutation were simultaneously amplified using the polymerase chain reaction. The amplified products were digested with restriction endonuclease TaqI and products were analyzed by gel electrophoresis.      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 The presence of the F5  gene mutation R506Q (1691G>A) mutation is an indication of an increased risk of developing a thrombosis. The extent of this risk is dependent upon several other known thrombophilic risk factors including smoking, obesity and the use of oral contraceptives. Consultation regarding these results is available upon request. Genetic counseling regarding these results is indicated in the evaluation of other family members.      COMMENTS       If a patient is the recipient of an allogeneic bone marrow transplant, this test must be done on a pre-transplant sample or buccal swab.  A previous allogeneic bone marrow transplant will interfere with test results.  Call the [email or web address] ([phone]) for instructions on sample collection for these patients.      DISCLAIMER       This test was developed and its performance characteristics determined by [email or web address]. It has not been cleared or approved by the FDA. The laboratory is regulated under CLIA as qualified to perform high-complexity testing. This test is used for clinical purposes. It should not be regarded as investigational or for research.    A resident/fellow in an accredited training program was involved in the selection of testing, review of laboratory data, and/or interpretation of this case.  I, as the senior physician, attest that I: (i) confirmed appropriate testing, (ii) examined the relevant raw data for the specimen(s); and (iii) rendered or confirmed the interpretation(s).      [facility]       [id number]      Specimen Description       Blood: ACD         If you have any questions or concerns, please call the clinic at the number listed above.       Sincerely,      PRAFUL Sumner CNP

## 2021-09-07 NOTE — PROGRESS NOTES
SUBJECTIVE:   CC: Alo Gallegos is an 26 year old male who presents for preventative health visit.       Patient has been advised of split billing requirements and indicates understanding: Yes  Healthy Habits:     Getting at least 3 servings of Calcium per day:  NO    Bi-annual eye exam:  NO    Dental care twice a year:  Yes    Sleep apnea or symptoms of sleep apnea:  None    Diet:  Regular (no restrictions)    Frequency of exercise:  None    Taking medications regularly:  Yes    Barriers to taking medications:  None    Medication side effects:  Lightheadedness    PHQ-2 Total Score: 1    Additional concerns today:  No    On clonidine for insomnia for years  Suspects at least 10 years    Father has Factor V  Paternal aunt recently had a blood clot  Half sisters and other family members have Factor V and he would like to be tested  Non smoker    Would like a sleep study  Has been on clonidine for years  Doesn't sleep if he doesn't take it    Work has been really busy  [employer]      Today's PHQ-2 Score: 1  PHQ-2 ( 1999 Pfizer) 9/7/2021   Q1: Little interest or pleasure in doing things 0   Q2: Feeling down, depressed or hopeless 0   PHQ-2 Score 0   Q1: Little interest or pleasure in doing things Several days   Q2: Feeling down, depressed or hopeless Not at all   PHQ-2 Score 1       Abuse: Current or Past(Physical, Sexual or Emotional)- No  Do you feel safe in your environment? Yes    Have you ever done Advance Care Planning? (For example, a Health Directive, POLST, or a discussion with a medical provider or your loved ones about your wishes): No, advance care planning information given to patient to review.  Patient plans to discuss their wishes with loved ones or provider.      Social History     Tobacco Use     Smoking status: Never Smoker     Smokeless tobacco: Never Used   Substance Use Topics     Alcohol use: Yes     Comment: very little     If you drink alcohol do you typically have >3 drinks per day or  >7 drinks per week? Yes      Alcohol Use 8/21/2020   Prescreen: >3 drinks/day or >7 drinks/week? -   Prescreen: >3 drinks/day or >7 drinks/week? No   No flowsheet data found.    Last PSA: No results found for: PSA    Reviewed orders with patient. Reviewed health maintenance and updated orders accordingly - Yes  Lab work is in process  Labs reviewed in EPIC  BP Readings from Last 3 Encounters:   09/07/21 110/60   08/21/20 108/60   08/23/19 104/68    Wt Readings from Last 3 Encounters:   09/07/21 57 kg (125 lb 11.2 oz)   08/21/20 57.5 kg (126 lb 11.2 oz)   08/23/19 54.4 kg (119 lb 14.4 oz)                  Patient Active Problem List   Diagnosis     Primary insomnia     ADHD (attention deficit hyperactivity disorder), combined type     History reviewed. No pertinent surgical history.    Social History     Tobacco Use     Smoking status: Never Smoker     Smokeless tobacco: Never Used   Substance Use Topics     Alcohol use: Yes     Comment: very little     Family History   Problem Relation Age of Onset     Factor V Leiden deficiency Father      Deep Vein Thrombosis Paternal Aunt      Colon Cancer No family hx of      Prostate Cancer No family hx of          Current Outpatient Medications   Medication Sig Dispense Refill     cloNIDine (CATAPRES) 0.3 MG tablet Take 1 tablet (0.3 mg) by mouth daily 30 tablet 11     MELATONIN 5 mg daily          Reviewed and updated as needed this visit by clinical staff                 Reviewed and updated as needed this visit by Provider                Past Medical History:   Diagnosis Date     Asperger's disorder         Review of Systems   Constitutional: Negative for chills and fever.   HENT: Negative for congestion, ear pain, hearing loss and sore throat.    Eyes: Negative for pain and visual disturbance.   Respiratory: Negative for cough and shortness of breath.    Cardiovascular: Negative for chest pain, palpitations and peripheral edema.   Gastrointestinal: Negative for abdominal  "pain, constipation, diarrhea, heartburn, hematochezia and nausea.   Genitourinary: Negative for discharge, dysuria, frequency, genital sores, hematuria, impotence and urgency.   Musculoskeletal: Positive for arthralgias and myalgias. Negative for joint swelling.   Skin: Negative for rash.   Neurological: Negative for dizziness, weakness, headaches and paresthesias.   Psychiatric/Behavioral: Negative for mood changes. The patient is not nervous/anxious.        OBJECTIVE:   /60   Pulse 56   Temp 98.3  F (36.8  C) (Oral)   Resp 20   Ht 1.685 m (5' 6.34\")   Wt 57 kg (125 lb 11.2 oz)   BMI 20.08 kg/m      Physical Exam  GENERAL: healthy, alert and no distress  EYES: Eyes grossly normal to inspection, PERRL and conjunctivae and sclerae normal  HENT: ear canals and TM's normal, nose and mouth without ulcers or lesions  NECK: no adenopathy, no asymmetry, masses, or scars and thyroid normal to palpation  RESP: lungs clear to auscultation - no rales, rhonchi or wheezes  CV: regular rate and rhythm, normal S1 S2, no S3 or S4, no murmur, click or rub, no peripheral edema and peripheral pulses strong  ABDOMEN: soft, nontender, no hepatosplenomegaly, no masses and bowel sounds normal  MS: no gross musculoskeletal defects noted, no edema  SKIN: no suspicious lesions or rashes  NEURO: Normal strength and tone, mentation intact and speech normal  PSYCH: mentation appears normal, affect normal/bright    Diagnostic Test Results:  Labs reviewed in Epic    ASSESSMENT/PLAN:     Problem List Items Addressed This Visit        Behavioral    ADHD (attention deficit hyperactivity disorder), combined type       Other    Primary insomnia    Relevant Medications    cloNIDine (CATAPRES) 0.3 MG tablet    Other Relevant Orders    SLEEP EVALUATION & MANAGEMENT REFERRAL - ADULT -      Other Visit Diagnoses     Routine general medical examination at a health care facility    -  Primary    Relevant Orders    Basic metabolic panel  (Ca, Cl, " "CO2, Creat, Gluc, K, Na, BUN) (Completed)    Lipid Profile (Chol, Trig, HDL, LDL calc) (Completed)    Family history of factor V deficiency        Relevant Orders    Factor 5 leiden mutation analysis          Patient has been advised of split billing requirements and indicates understanding: Yes  COUNSELING:   Reviewed preventive health counseling, as reflected in patient instructions       Regular exercise       Healthy diet/nutrition       Immunizations    Declined: Covid        Estimated body mass index is 20.45 kg/m  as calculated from the following:    Height as of 8/21/20: 1.676 m (5' 6\").    Weight as of 8/21/20: 57.5 kg (126 lb 11.2 oz).         He reports that he has never smoked. He has never used smokeless tobacco.      Counseling Resources:  ATP IV Guidelines  Pooled Cohorts Equation Calculator  FRAX Risk Assessment  ICSI Preventive Guidelines  Dietary Guidelines for Americans, 2010  USDA's MyPlate  ASA Prophylaxis  Lung CA Screening    PRAFUL Lewis CNP  [facility]    "

## 2021-10-21 VITALS — BODY MASS INDEX: 19.29 KG/M2 | WEIGHT: 120 LBS | HEIGHT: 66 IN

## 2021-10-21 ASSESSMENT — MIFFLIN-ST. JEOR: SCORE: 1467.07

## 2021-10-21 NOTE — PATIENT INSTRUCTIONS
Insomnia and Behavioral Sleep Medicine Program    The [facility] Insomnia and Behavioral Sleep Medicine Program provides non-drug treatment for sleep problems including:      Cognitive-behavioral Therapies for Insomnia (CBT-I)    Management of Shift-work and Jet Lag    Management of Delayed, Advanced and Irregular Circadian Rhythm Sleep Disorders    Imagery Rehearsal Therapy (IRT) for Nightmare Disorder    PAP Therapy Desensitization    You have been referred for consultation with a sleep psychologist who specializes in behavioral sleep medicine and treatment of insomnia.  The [facility] Insomnia and Behavioral Sleep Medicine Program offers individualized telehealth services through our [facility] and online CBT-I.    Preparing for your Consultation:    You will need to keep a Sleep Diary for at least a week prior to your visit.  Simply download he CBTi- robert on your mobile devise and enter your sleep estimates in the My Sleep section each day.  You can also use the paper sleep diary provided. Your estimates should be your recollection of your last night's sleep.  We recommend you DO watch the clock or gather data during the night.      OR        Make sure to have your CBTI  Robert or paper sleep diary data available to review during consultation with your sleep provider.  You can also e-mail your sleep diary information to [email or web address] or fax it to [phone].    Frequently Asked Questions    What is CBT-I?    Cognitive Behavioral Therapy for Insomnia, also known as CBT-I, is a highly effective non-drug treatment for insomnia. The American College of Physicians recommends CBT-I as the first treatment for chronic insomnia.  Research has shown CBT-I to be safer and more effective long term than sleeping pills.    What does CBT-I involve?     CBT-I targets behaviors that lead to chronic insomnia:    Habits that weaken the bed as a cue for sleep    Habits that  weaken your body's sleep drive and sleep/wake clock     Unhelpful sleep thoughts that increase sleep-related worry and arousal.    The process works like a 4-6 session training program that provides you with the information and coaching needed to implement proven strategies to get a better night's sleep.    People often see improvement in their sleep within a few weeks. Research shows if you keep practicing the skills you learn your sleep is likely to continue to improve 6-12 months after treatment.    Does this program prescribe or manage sleep medication?    No.  Your prescribing provider is responsible to assist you in managing your sleep medications.  Some people choose to stop using sleep medication prior to or during CBT-I.  Our program can work with your prescribing provider to help reduce or eliminate use of sleep medications.     Can I Get Started Today?    Yes, you Can!  With our Online CBT-I program.     Research indicates that online CBT-I can be as effective as in-person therapy for motivated patients. It requires comfort with online learning and confidence that making changes in sleep habits can improve your sleep. Different from other online CBT-I programs, our insomnia program incorporates virtual visit follow-up. Our online CBT-I program is formatted for use on computers and mobile devices.  The cost for an entire 6 week program is $40.           Online CBT-I is not for everyone.  Contraindications include individuals with seizure disorders, bipolar disorder, unstable medical or mental health conditions, risk of falling, or are pregnant.    If you opt to try online CBT-I, we ask that you sign a consent for the [facility] to share your online CBT-I information with our sleep program.  To optimize care coordination, we ask that you also agree to share data from your daily sleep diaries. This can be done by entering the sharing code Cloud Your Car.    To get started, copy and past the link below which  will take you to the landing page to register:            [email or web address]                                 Are there any recommended self-help workbooks?    Yes!  We recommend you consider:    Say Priscilla to Insomnia:The Six-Week, Drug-Free Program Developed At [facility].  Anuj Matt MD. Available in paperback, Traci and audiobook.        Overcoming Insomnia: A Cognitive-Behavioral Therapy Approach, Workbook.  Delbert Bonilla, PhD  and Angelic Montiel, PhD.  Available in paperback and Traci.        Quiet Your Mind and Get to Sleep: Solutions to Insomnia for Those with Depression, Anxiety, or Chronic Pain.  Renee Collins, PhD and Angelic Montiel, PhD.  Available in paperback and Traci

## 2021-10-21 NOTE — PROGRESS NOTES
Alo is a 26 year old who is being evaluated via a billable video visit.      How would you like to obtain your AVS? MyChart  If the video visit is dropped, the invitation should be resent by: Text to cell phone: [phone]  Will anyone else be joining your video visit? No    Jessi Khanna CMA    Video-Visit Details    Type of service:  Video Visit    Video Start Time: 8:02AM    Video End Time:8:12AM    Originating Location (pt. Location): Home    Distant Location (provider location):  [facility]     Platform used for Video Visit: Unable to complete video visit - both Amwell and Doximity tried but poor connection. Visit transitioned to telephone only.     Telephone call start time: 8:12AM  Telephone call end time: 8:53AM    [facility]   Outpatient Sleep Medicine Consultation  Oct 22, 2021       Name: Alo Gallegos MRN# [id number]   Age: 26 year old YOB: 1995     Date of Consultation: Oct 22, 2021   Consultation is requested by: PRAFUL Sumner CNP  [address] [place] [address] Kristen Davis  Primary care provider: Kristen Davis         Assessment and Plan:   1. Chronic insomnia  Patient presents to clinic today for evaluation of chronic insomnia.  Patient reports that insomnia began early childhood.  He has been managing his insomnia with nightly clonidine and melatonin for a number of years. Primary goal of today's visit was to discuss alternative treatment options as he is either interested in getting off the medications altogether or potentially switching his medications up.  We discussed that the first-line treatment for chronic insomnia is actually cognitive behavioral therapy (CBT-I) and he was interested in referral to sleep psychology. Order was placed today.  We also briefly discussed the option of pursuing an overnight polysomnogram but ultimately agreed to hold off at this time.  He does  "report history of snoring (though this is not loud or socially disruptive) and male gender increases risk of sleep disordered breathing, but STOP-BANG still quite low and my suspicion for significant apnea is low as well. He also reports dream enactment behavior so RBD is a consideration but appears quite rare/sporadic and his history is otherwise atypical. Given that insomnia and desire to not require sleep aid to sleep is primary focus agreed to start with CBT-I but could consider PSG pending progress or if develops additional concerning symptoms. Educational materials provided in instructions.  - SLEEP PSYCHOLOGY REFERRAL; Future       Chief Complaint / Reason for Sleep Consult:     Chief Complaint   Patient presents with     Sleep Problem     Need meds to fall asleep          History of Present Illness:     Alo Gallegos is a 26 year old male who presents to the clinic for evaluation of insomnia.  Patient states the goal of today's visit is to discuss alternative treatment options to his current medication regimen for treating his insomnia which includes melatonin 5 mg and clonidine 0.3 mg.  Patient states that his insomnia and difficulties with sleep started in early childhood.  Patient states that he has been on the clonidine for \"close to 2 decades\".  Melatonin was added later.  Recalls that he was on medications prior to clonidine but does not recall names of them.  He feels that his current medications are helpful but not perfect.      He estimates that he is currently sleeping about 8 hours a night.  Sleep schedule is somewhat dependent on work schedule.  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  His current sleep schedule includes taking his melatonin and clonidine right after getting home from work which is typically between 6-8:00 PM.  Tries to get into bed around 8:00 PM with goal of sleeping by 9:00 PM estimates that most nights he is sleeping sometime between " "830-9:30 PM but can be as late as 11:00PM. Wakes for the day at 5:20AM for work. Denies difficulty waking in the morning, states \"I don't want to get up but its not hard I just use one alarm unlike other people\". On weekends, schedule is delayed a bit, takes medications at 9:00PM and is sleeping around 11:00PM and wakes at 9:00AM at the latest.     Patient reports that if he does not take his sleep aids he will be unable to fall asleep at all - \"I tried it a couple days in a row where I didn't take them and I was awake the entire time I just do not sleep\". When asked if he felt tired or sleepy on these days he responded \"I don't think I know what that feeling is without my medicine I don't know what a typical falling asleep feeling is\".     Denies any daytime napping - \"I honestly am unable to in the past 20 years I have taken one nap\". Denies inadvertent dozing. No drowsy driving. He had a total Philipp Sleepiness Scale of 0 (10/21/21 1100), with scores of 10 or higher indicating abnormal levels of sleepiness.    Unclear by history but possible restless legs syndrome symptoms that occur \"every once in a while\" while trying to fall asleep. No consistency and was unable to elaborate on how often but appears rare. He describes \"a feeling like I can't sit still and maybe a restlessness I suppose but if I had to wager a guess it's when I am hyperfocused on something from work and I get shifty at night thinking about it\". No kicking or leg movements in sleep.     Spends half his time at hailey's home and half his time at parents home. Hailey has mentioned \"once or twice\" that it appeared he acted out a dream. Patient states he is \"a  when I dream vividly\". Recalls coming out a dream punching the air before. Unclear number of times but appears sporadic - \"its nothing consistent just really randomly I guess\".     Hailey also notes snoring. Has snored since childhood but is not loud or socially disruptive - \" I am told " "that I snore yeah but never been told I can't be around you because of it or not like my dad he's loud\". No witnessed apneas. Denies gasp/choking arousals. Denies nocturnal GERD. No morning headaches.     Does admit to sleep talking. History of somnambulism x1 age 6-7.  No bruxism.     SCALES       SLEEP APNEA: Stopbang score  1-2/8       INSOMNIA:  Insomnia severity score: 22/28   absence of insomnia (0-7); sub-threshold insomnia (8-14); moderate insomnia (15-21); and severe insomnia (22-28)       SLEEPINESS: [place] sleepiness scale: 0/24 [normal < 11]          Medications:     Current Outpatient Medications   Medication Sig     cloNIDine (CATAPRES) 0.3 MG tablet Take 1 tablet (0.3 mg) by mouth daily     MELATONIN 5 mg daily      No current facility-administered medications for this visit.           Allergies:     Allergies   Allergen Reactions     Cats           Past Medical History:     Past Medical History:   Diagnosis Date     Asperger's disorder            Past Surgical History:    Previous upper airway surgery: denies  No past surgical history on file.         Social History:     Social History     Tobacco Use     Smoking status: Never Smoker     Smokeless tobacco: Never Used   Substance Use Topics     Alcohol use: Yes     Comment: very little     Chemical History:  Alcohol use: Couple times per year      Tobacco use: Denies    Illicit substances: Denies  Caffeine intake: Drinks 1 Monster energy drink at work. 1-2 cans of Mountain Dew on weekends. Does not cut off at any time, can take sips in middle of the night         Family History:     Family History   Problem Relation Age of Onset     Factor V Leiden deficiency Father      Deep Vein Thrombosis Paternal Aunt      Colon Cancer No family hx of      Prostate Cancer No family hx of       Sleep Family Hx: Mother with insomnia and father with snoring. Patient denies any known family history of sleep apnea, restless legs syndrome, narcolepsy or other sleep " "disorders.          Physical Examination:   Ht 1.676 m (5' 6\")   Wt 54.4 kg (120 lb)   BMI 19.37 kg/m    General: Cooperative and pleasant. In no apparent distress.  Pulmonary:  Able to speak easily in full sentences. No cough or wheeze.   Neurologic: Alert, oriented x3.   Psychiatric: Mood euthymic. Affect congruent with full range and intensity.         Data: All pertinent previous laboratory data reviewed     No results found for: PH, PHARTERIAL, PO2, [id number], SAT, PCO2, HCO3, BASEEXCESS, MAXINE, BEB  Lab Results   Component Value Date    TSH 0.75 09/09/2011    TSH 0.90 07/30/2010     Lab Results   Component Value Date    GLC 97 09/07/2021    GLC 96 08/23/2019     Lab Results   Component Value Date    HGB 14.0 12/04/2009     Lab Results   Component Value Date    BUN 12 09/07/2021    BUN 14 12/04/2009    CR 1.14 09/07/2021    CR 0.68 12/04/2009     Lab Results   Component Value Date    AST 29 12/04/2009    ALT 28 12/04/2009    ALKPHOS 188 12/04/2009    BILITOTAL 0.3 12/04/2009    BILICONJ 0.0 12/04/2009     No results found for: UAMP, UBARB, BENZODIAZEUR, UCANN, UCOC, OPIT, UPCP      Copy to: Kristen Davis PA-C  Oct 22, 2021     [facility]  [address] [place] [address]     [facility] [place]  [address] [place] [id number]    Chart documentation was completed, in part, with [employer]vers[employer] voice-recognition software. Even though reviewed, some grammatical, spelling, and word errors may remain.    >60 minutes spent on day of encounter doing chart review, history and exam, documentation, and further activities as noted above    "

## 2021-10-22 ENCOUNTER — VIRTUAL VISIT (OUTPATIENT)
Dept: SLEEP MEDICINE | Facility: CLINIC | Age: 26
End: 2021-10-22
Attending: NURSE PRACTITIONER
Payer: COMMERCIAL

## 2021-10-22 DIAGNOSIS — F51.04 CHRONIC INSOMNIA: Primary | ICD-10-CM

## 2021-10-22 PROCEDURE — 99205 OFFICE O/P NEW HI 60 MIN: CPT | Mod: 95 | Performed by: PHYSICIAN ASSISTANT

## 2022-01-05 ASSESSMENT — ENCOUNTER SYMPTOMS
MYALGIAS: 0
JOINT SWELLING: 0
MUSCLE CRAMPS: 1
ARTHRALGIAS: 0
DEPRESSION: 0
MUSCLE WEAKNESS: 0
STIFFNESS: 0
NERVOUS/ANXIOUS: 1
INSOMNIA: 1
BACK PAIN: 1
DECREASED CONCENTRATION: 0
PANIC: 0
NECK PAIN: 1

## 2022-01-05 ASSESSMENT — SLEEP AND FATIGUE QUESTIONNAIRES
HOW LIKELY ARE YOU TO NOD OFF OR FALL ASLEEP WHILE SITTING AND TALKING TO SOMEONE: WOULD NEVER DOZE
HOW LIKELY ARE YOU TO NOD OFF OR FALL ASLEEP WHILE WATCHING TV: WOULD NEVER DOZE
HOW LIKELY ARE YOU TO NOD OFF OR FALL ASLEEP WHILE SITTING AND READING: WOULD NEVER DOZE
HOW LIKELY ARE YOU TO NOD OFF OR FALL ASLEEP WHEN YOU ARE A PASSENGER IN A CAR FOR AN HOUR WITHOUT A BREAK: WOULD NEVER DOZE
HOW LIKELY ARE YOU TO NOD OFF OR FALL ASLEEP WHILE SITTING INACTIVE IN A PUBLIC PLACE: WOULD NEVER DOZE
HOW LIKELY ARE YOU TO NOD OFF OR FALL ASLEEP WHILE SITTING QUIETLY AFTER LUNCH WITHOUT ALCOHOL: WOULD NEVER DOZE
HOW LIKELY ARE YOU TO NOD OFF OR FALL ASLEEP WHILE LYING DOWN TO REST IN THE AFTERNOON WHEN CIRCUMSTANCES PERMIT: WOULD NEVER DOZE
HOW LIKELY ARE YOU TO NOD OFF OR FALL ASLEEP IN A CAR, WHILE STOPPED FOR A FEW MINUTES IN TRAFFIC: WOULD NEVER DOZE

## 2022-01-07 ENCOUNTER — VIRTUAL VISIT (OUTPATIENT)
Dept: SLEEP MEDICINE | Facility: CLINIC | Age: 27
End: 2022-01-07
Attending: PHYSICIAN ASSISTANT
Payer: COMMERCIAL

## 2022-01-07 VITALS — HEIGHT: 66 IN | WEIGHT: 121 LBS | BODY MASS INDEX: 19.44 KG/M2

## 2022-01-07 DIAGNOSIS — F51.04 CHRONIC INSOMNIA: ICD-10-CM

## 2022-01-07 PROCEDURE — 90791 PSYCH DIAGNOSTIC EVALUATION: CPT | Mod: 95 | Performed by: PSYCHOLOGIST

## 2022-01-07 ASSESSMENT — MIFFLIN-ST. JEOR: SCORE: 1471.6

## 2022-01-07 NOTE — PATIENT INSTRUCTIONS
1.  Towards her goal of having more time in the evening with all the effects of sedating medication, I recommend that you consult with a psychiatrist to review her medication and determine if there are alternatives for treatment of ADHD and for sleep if needed.    2.  Recommend you avoid using high doses melatonin beyond 10mg.  There is no consensus in the literature that doses higher than this her effective for insomnia.  The best evidence for use of melatonin in adults is at very low doses to help adjust sleep rhythm.  You may also want to consult with your physician appropriate use of this OTC supplement.    3.  Try to avoid sleeping on Jorge mornings so that you have enough time to wake up as he approaches Sunday night bedtime.  This may help you sleep more on Jorge night and get up to better start on Monday mornings.    4.  Try to avoid use of screens and devices in bed if possible.  I do understand that it may be helpful to have something distracting when getting into bed.  You may want to sit up in bed, read a book for a while or get out of bed and read before returning back to bed.  I am glad to see that you are trying to reduce these devices bluelight intensity.    5.  Ensure that you get exposure to bright light in the mornings can help promote earlier sleep onset.  Bright light exposure in the morning predicts when somebody will be able to fall asleep the next night.  Consider how to infuse your environment with adequate late in the mornings as you begin preparation to get ready for work.  1 way to do this is to use a bright light box, 10,000 Lux, full-spectrum, with UV protection for 30minutes.  Contraindications however any history of skin cancer, eye disease, bipolar disorder or seizure disorder.    Here is her basic insomnia behavioral module.            CBT-I Module - Sleep Consolidation Training      To prepare for Sleep Consolidation Training, we recommend you make the following changes to set  the stage for a better night's sleep:    ? Reduce your consumption of caffeine and alcohol.  Both can disrupt sleep and make strengthening your sleep more difficult.  Specifically:    - Avoid caffeine within 6 hours of bedtime   - No more than 3 caffeinated beverages per day (e.g. 8 oz. cup coffee or 12 oz. cup soda)           - No alcohol within 3 hours of bedtime    ? Make sure your bedroom is quiet, comfortable and dark.  Noise, light and an uncomfortable sleep space can harm your sleep.      Core Sleep Consolidation Strategies    Much like physical activity and eating nutritious foods strengthens our body, studies show that the following core strategies are the key to achieving stronger, healthier sleep.     1. Reduce your Time In Bed    Spending extra time in bed trying to get more sleep actually can cause more sleep disruption and weaken sleep efficiency. Sleep efficiency is simply the percentage of time a person spends asleep while in bed. Normal sleep efficiency is thought to be 85% or greater.  By reducing your time in bed, you will be awake longer leading to quicker and deeper sleep. This strategy does not reduce the amount of sleep you get, just the time you are awake in bed:                                             During the first step of sleep consolidation training you will reduce your time in bed and maintain the following Sleep Schedule Prescription. Your prescription is determined by the average nightly amount of sleep you got over the past two weeks plus 30 minutes. It also takes into account the best time for you to sleep. The least amount of time prescribed is 6 hours in bed.    Your Sleep Schedule Prescription                    Total Time in Bed:  8 hours                  Bedtime:  No earlier than 9 pm                   Wake-up Time:  Out of bed every day by 5:53 AM  With at least 30 minutes of bright light in the morning upon getting up    2.  Don't go to bed until you feel sleepy (not  tired or fatigued)     This helps increase your sleep drive by keeping you awake longer.  If you go to bed when you're not sleepy, it gives your brain the wrong message and can lead to frustration.     If you feel sleepy before your prescribed bedtime, find activities that can help you stay awake until it is time for you to go to bed. Even brief naps in the evening can be very disruptive of sleep at night.     3.  Don't stay in bed unless you are sleeping      If you are unable to fall asleep or return to sleep after about 30 minutes, get out of bed. This helps train your brain that the bed is for sleep. It is harmful to your sleep when you are worried or frustrated in bed. Do not read, eat, worry, think about sleep, use mobile phones or tablets, or watch TV in bed. Do not watch the clock during the night.     Go to another room and do something relaxing. Plan things you can do when you get out of bed. Avoid use of mobile devices or computers when out of bed.    Return to bed only when you feel sleepy again.  Repeat as often as needed throughout the night.     4.  Get out of bed at the same time every day    Getting up at the same time helps set your biological clock. It is important to stick to your wake time no matter how much sleep you got the night before or how you feel in the morning.    Varying your wake can have the same effect as jet lag.  It disrupts your biological clock and makes you feel more tired and exhausted.  Trying to  catch up  on sleep on the weekends only makes things worse and sets you up for a cycle of poor sleep the following weekdays.      Make sure you set an alarm and keep it away from the bed to prevent looking at the clock during the night.      5.  Avoid daytime napping    Avoid daytime napping if possible. Napping partially fulfills your need for sleep and weakens your sleep drive at night.    However, if you find yourself sleepy (not just tired) you can take a brief 15-30 minute nap  during the day if needed.  Naps within 7-8 hours of your prescribed wake time are less likely to affect your sleep the coming night.  Sleepy people make more mistakes and may hurt themselves or others. Therefore, safety trumps all other sleep prescriptions.  Never drive or operate equipment if drowsy or sleepy.

## 2022-01-07 NOTE — PROGRESS NOTES
Alo is a 26 year old who is being evaluated via a billable video visit.      How would you like to obtain your AVS? MyChart  If the video visit is dropped, the invitation should be resent by: Text to cell phone: [phone]  Will anyone else be joining your video visit? No    Video Start Time: 10:17 AM  Video-Visit Details    Type of service:  Video Visit    Video End Time:11:05 AM    Originating Location (pt. Location): Home    Distant Location (provider location):  [facility]     Platform used for Video Visit: [facility]     SLEEP MEDICINE CONSULTATION  Sleep Psychology    Name: Alo Gallegos MRN# [id number]   Age: 26 year old YOB: 1995     Date of Consultation: Jan 7, 2022  Consultation is requested by: Serena Franco PA-C  [address] [place] [address]  Primary care provider: Kristen Davis    Reason for Sleep Consultation     Alo Gallegos is a 26 year old male seen today for a behavioral sleep medicine consultation because of insomnia    Assessment and Plan     Sleep Diagnoses/Recommendations:    Chronic insomnia  Alo Gallegos was seen for sleep psychology consultation to address chronic, near lifelong history of insomnia associated with childhood diagnoses of Asperger's disorder and ADHD.    Recent sleep consultation phone no significant risk for sleep disordered breathing other intrinsic sleep disorder    Patient insomnia is likely multifactored with contribution lately from a very early work start time which requires him to get up at 5-5:30 AM.  On weekends he is sleeping in 2 what is likely more needed wake time of 830-9 AM.  This results in difficulty falling asleep on Sunday nights.  Additionally he is taking high doses of melatonin along with clonidine taken 2 hours before bedtime.  This is effects evening 3/social time because both of the medications sedative properties.    Patient was advised to talk to consulting  psychiatrist around his current prescription of clonidine and look at alternatives that may prevent evening side effects prior to his bedtime of 10 PM.  He was advised to maintain a consistent sleep schedule of 9 PM-5 AM during the workweek.  On weekends, twice daily would be to keep the same sleep schedule but for social/relationship needs he was advised to allow himself to stay Friday nights later and is sleeping on Saturday mornings.  On Jorge morning however, he was advised to get up early at 5:30 AM to allow for enough time to wait before Sunday night bedtime.  This may help result in better quality sleep on Sunday night before the beginning of the workweek.    Summary Counseling:      Alo was provided information about the pathophysiology of insomnia, psychophysiological factors contributing to the onset and maintenance of insomnia and how co-occurring medical conditions and intrinsic sleep disorders can affect sleep.  Treatment options were discussed including component of cognitive-behavioral therapy for insomnia as applicable to patient specific sleep concerns and symptoms. The benefits and potential early side effects of treatment including increased daytime sleepiness were discussed.     Patient was advised to consult with their prescribing provider around use of or changes to prescription sleep medication.  Patient was counseled on the importance of avoiding driving if drowsy.    See patient instructions for initial treatment recommendations and behavioral sleep plan.    Follow-up: as needed     History of Present Sleep Complaint     Alo Gallegos is a 26 year old year old male with a relevant medical history of  ADHD who presents with near life long history of insomnia.    He reports he has an undiagnosed issue of dizziness and light headedness which he usually experiences just after he eats.    Currently using melatonin between 10 mg to 20 mg.    Patient states that he takes clonidine at 7 PM.  It  causes sedative effect for 2 hours prior to him going to bed.  He is concerned that it restricts his ability to socialize or go out with friends.    He has been on clonidine for years and is unclear about whether it is treating ADHD, Asperger disorder syndromes or for sleep.  He is not currently seeing a psychiatrist.    Sleep/Wake Pattern:    Shift Work - No  Source of Sleep Estimates:  paper sleep diary    Time to Bed:  8-9 pm on workdays, and 10-1130pm  on days off  Activity in Bed (stimulus control): lights out but may use phone under the covers  Sleep Latency: varies from  minutes  Times awakened: several brief awakenings  Total Time Awake After Sleep Onset: less than 30 minutes  Time Up for the Day: 5 am - 5:30 am, weekends 8-9:30 AM  Total Time in Bed:  8-9 on weekdays and 10-10.5  hours  Estimated Total Sleep Time: 7-7 0.5 hours  Sleep Efficiency Estimate: Less than 85%    Naps: None    Sleep Hygiene:    Behavior affecting Sleep:  use computers or electronics within an hour before bedtime, extend time in bed longer if after poor night of sleep, sleep in on weekends    Environment:  Bedroom is quiet, comfortable and dark    Substance Use:  Caffeine: 1-2 beverages, does drink caffeine within 6 hours of bedtime      Alcohol: None reported      Nicotine: None      Recreational/Illicit Drugs: None reported    Previous Sleep Studies/Consultations:    No previous sleep studies, sleep consultation he will with Serena Ryan PA-C in November 2021.  No recommendation for PSG.  Patient was referred to sleep psychology    Screening     EPWORTH SLEEPINESS SCALE    Sitting and reading 0   Watching TV 0   Sitting, inactive in a public place (theatre or mtg.) 0    As a passenger in a car 0   Lying down to rest in the afternoon when circumstance permit 0   Sitting and talking to someone 0   Sitting quietly after lunch without alcohol 0   In a car, while stopped for a few minutes in traffic 0   TOTAL SCORE (nl <11) 0  "    INSOMNIA SEVERITY INDEX     Difficulty falling asleep 4   Difficult staying asleep 2   Problems waking up to early 1   How SATISFIED/DISSATISFIED are you with your CURRENT sleep pattern? 4   How NOTICEABLE to others do you think your sleep pattern is in terms of your quality of life? 4   How WORRIED/DISTRESSED are you about your current sleep pattern? 4   To what extent do you consider your sleep problem to INTERFERE with your daily fuctioning(e.g. daytime fatigue, mood, ability to function at work/daily chores, concentration, mood,etc.) CURRENTLY? 4   INSOMNIA SEVERITY INDEX TOTAL SCORE 23   Absence of insomnia (0-7); sub-threshold insomnia (8-14); moderate insomnia (15-21); and severe insomnia (22-28)    No flowsheet data found.     No flowsheet data found.       Vitals     Ht 1.676 m (5' 6\")   Wt 54.9 kg (121 lb)   BMI 19.53 kg/m       Medical History     Patient Active Problem List   Diagnosis     Primary insomnia     ADHD (attention deficit hyperactivity disorder), combined type        Current Outpatient Medications   Medication Sig Dispense Refill     cloNIDine (CATAPRES) 0.3 MG tablet Take 1 tablet (0.3 mg) by mouth daily 30 tablet 11     MELATONIN 5 mg daily          No past surgical history on file.       Allergies   Allergen Reactions     Cats        Mental Health History     Prior Mental Health Diagnosis: ADHD and Asperger's disorder    Current Mental Health Treatment: primary care medication management, Clonidine    Chemical Abuse/Treatment:  None reported      Family History     Family History   Problem Relation Age of Onset     Factor V Leiden deficiency Father      Deep Vein Thrombosis Paternal Aunt      Colon Cancer No family hx of      Prostate Cancer No family hx of        Family History of Sleep Disorders: None reported    Social History     Social History     Socioeconomic History     Marital status: Single     Spouse name: None     Number of children: None     Years of education: None     " Highest education level: None   Occupational History     None   Tobacco Use     Smoking status: Never Smoker     Smokeless tobacco: Never Used   Substance and Sexual Activity     Alcohol use: Yes     Comment: very little     Drug use: No     Sexual activity: Yes     Partners: Female   Other Topics Concern     Parent/sibling w/ CABG, MI or angioplasty before 65F 55M? 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  The patient was cooperative throughout the evaluation with no signs of hallucinations, delusions, loosening of associations or other thought disturbance.  Mood was normal Affect was congruent with mood. Insight and judgement were intact.  Memory appeared intact for recent and remote elements.  There was no report of suicidal ideation, intention or plan. Attention and concentration were within normal.      Copy:   Kristen Davis PA-C  [address] [place] [address]    Scottie Morrison PsyD, RADHA, [facility]  Diplomate, Behavioral Sleep Medicine  [facility]    Note: This dictation was created using voice recognition software. This document may contain an error not identified before finalizing the document. If the error changes the accuracy of the document, I would appreciate it being brought to my attention.

## 2022-06-03 ENCOUNTER — VIRTUAL VISIT (OUTPATIENT)
Dept: PEDIATRICS | Facility: CLINIC | Age: 27
End: 2022-06-03
Payer: COMMERCIAL

## 2022-06-03 DIAGNOSIS — Z76.89 ENCOUNTER TO ESTABLISH CARE: Primary | ICD-10-CM

## 2022-06-03 DIAGNOSIS — G47.00 INSOMNIA, UNSPECIFIED TYPE: ICD-10-CM

## 2022-06-03 PROBLEM — D68.51 FACTOR V LEIDEN MUTATION (H): Status: ACTIVE | Noted: 2022-06-03

## 2022-06-03 PROCEDURE — 99213 OFFICE O/P EST LOW 20 MIN: CPT | Mod: 95 | Performed by: NURSE PRACTITIONER

## 2022-06-03 NOTE — LETTER
[facility]  [address]  [address]  [place] [id number] [phone]          Erlinda 3, 2022    RE:  Alo Gallegos                                                                                                                                                       [address] [address]            To whom it may concern:    Alo Gallegos is under my professional care. Alo has a longstanding history of insomnia, for which he is receiving treatment. Occasionally, his insomnia results in his inability to work the following day. I recommend excused absences from work up to two times per month.       Sincerely,          Sofiya Mendez, DNP, APRN, CNP

## 2022-06-03 NOTE — PROGRESS NOTES
Alo is a 27 year old who is being evaluated via a billable video visit.      How would you like to obtain your AVS? MyChart  If the video visit is dropped, the invitation should be resent by: Text to cell phone: [phone]  Will anyone else be joining your video visit? No      Video Start Time: 2:27 PM    Assessment & Plan     Encounter to establish care  Histories and medications reviewed and updated.    Insomnia, unspecified type  Stable on current dose of clonidine and melatonin. Reviewed most recent note from sleep medicine with the patient. I gave him a letter for work excusing up to 2 absences/month. Should he miss more work, advised he look into [facility].         20 minutes spent on the date of the encounter doing chart review, patient visit and documentation        MEDICATIONS:  Continue current medications without change  FUTURE APPOINTMENTS:       - Follow-up for annual visit or as needed    Return in about 3 months (around 9/3/2022) for Annual Preventative Exam.    PRAFUL Acosta CNP  [facility]    Jono Prajapati is a 27 year old who presents for the following health issues:    HPI     Saw sleep medicine on 10/22/21 and 1/7/2022 at which time it was recommended he follow-up with a psychiatrist to determine if there are alternatives for treatment of ADHD and for sleep if needed. Was advised to avoid using high doses of melatonin beyond 10 mg.     Reports he is taking 10 mg melatonin.   Taking clonidine 0.3 mg for sleep.   Has missed work as a result of his insomnia. He had a bad month and called in sick to work twice in one month. He is requesting a note that excuses him from work in the event he calls in sick due to insomnia.   Was diagnosed with ADHD as a child but he feels he has grown out of his ADHD, denies significantly bothersome symptoms of ADHD.     Sometimes after eating has dizziness and limbs feel heavy. To avoid these symptoms he tried to eat smaller meals/snacks  while working.       Patient Active Problem List   Diagnosis     Primary insomnia     ADHD (attention deficit hyperactivity disorder), combined type     Past Medical History:   Diagnosis Date     Asperger's disorder      Factor V Leiden mutation (H)      Other insomnia      Past Surgical History:   Procedure Laterality Date     NO HISTORY OF SURGERY       Family History   Problem Relation Age of Onset     Factor V Leiden deficiency Father      Deep Vein Thrombosis Paternal Aunt      Colon Cancer No family hx of      Prostate Cancer No family hx of      Social History     Socioeconomic History     Marital status: Single     Spouse name: Not on file     Number of children: Not on file     Years of education: Not on file     Highest education level: Not on file   Occupational History     Not on file   Tobacco Use     Smoking status: Never Smoker     Smokeless tobacco: Never Used   Substance and Sexual Activity     Alcohol use: Yes     Comment: very little     Drug use: No     Sexual activity: Yes     Partners: Female   Other Topics Concern     Parent/sibling w/ CABG, MI or angioplasty before 65F 55M? 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 They are not officially .             Sofiya Mendez, DNP, APRN, CNP    06/03/22         Social Determinants of Health     Financial Resource Strain: Not on file   Food Insecurity: Not on file   Transportation Needs: Not on file   Physical Activity: Not on file   Stress: Not on file   Social Connections: Not on file   Intimate Partner Violence: Not on file   Housing Stability: Not on file       Current Outpatient Medications   Medication     cloNIDine (CATAPRES) 0.3 MG tablet     MELATONIN     No current facility-administered medications for this visit.        Allergies   Allergen Reactions     Cats          Review of Systems    ROS: 10 point ROS neg other than the symptoms noted  above in the HPI.        Objective         Vitals:  No vitals were obtained today due to virtual visit.    Physical Exam   GENERAL: Healthy, alert and no distress  EYES: Eyes grossly normal to inspection.  No discharge or erythema, or obvious scleral/conjunctival abnormalities.  RESP: No audible wheeze, cough, or visible cyanosis.  No visible retractions or increased work of breathing.    SKIN: Visible skin clear. No significant rash, abnormal pigmentation or lesions.  NEURO: Cranial nerves grossly intact.  Mentation and speech appropriate for age.  PSYCH: Mentation appears normal, affect normal/bright, judgement and insight intact, normal speech and appearance well-groomed.            Video-Visit Details    Type of service:  Video Visit    Video End Time:2:50 PM    Originating Location (pt. Location): Home    Distant Location (provider location):  [facility]     Platform used for Video Visit: [employer] ([employer])

## 2022-09-23 ENCOUNTER — OFFICE VISIT (OUTPATIENT)
Dept: PEDIATRICS | Facility: CLINIC | Age: 27
End: 2022-09-23
Payer: COMMERCIAL

## 2022-09-23 VITALS
HEIGHT: 66 IN | DIASTOLIC BLOOD PRESSURE: 60 MMHG | HEART RATE: 72 BPM | SYSTOLIC BLOOD PRESSURE: 110 MMHG | OXYGEN SATURATION: 97 % | TEMPERATURE: 97 F | WEIGHT: 134 LBS | BODY MASS INDEX: 21.53 KG/M2 | RESPIRATION RATE: 16 BRPM

## 2022-09-23 DIAGNOSIS — K64.4 EXTERNAL HEMORRHOIDS: ICD-10-CM

## 2022-09-23 DIAGNOSIS — R11.0 POSTPRANDIAL NAUSEA: ICD-10-CM

## 2022-09-23 DIAGNOSIS — F51.01 PRIMARY INSOMNIA: ICD-10-CM

## 2022-09-23 DIAGNOSIS — Z00.00 ROUTINE GENERAL MEDICAL EXAMINATION AT A HEALTH CARE FACILITY: Primary | ICD-10-CM

## 2022-09-23 DIAGNOSIS — D68.51 FACTOR V LEIDEN MUTATION (H): ICD-10-CM

## 2022-09-23 PROCEDURE — 99213 OFFICE O/P EST LOW 20 MIN: CPT | Mod: 25 | Performed by: NURSE PRACTITIONER

## 2022-09-23 PROCEDURE — 99395 PREV VISIT EST AGE 18-39: CPT | Performed by: NURSE PRACTITIONER

## 2022-09-23 RX ORDER — CLONIDINE HYDROCHLORIDE 0.3 MG/1
0.3 TABLET ORAL DAILY
Qty: 30 TABLET | Refills: 11 | Status: SHIPPED | OUTPATIENT
Start: 2022-09-23 | End: 2023-08-02

## 2022-09-23 SDOH — ECONOMIC STABILITY: INCOME INSECURITY: HOW HARD IS IT FOR YOU TO PAY FOR THE VERY BASICS LIKE FOOD, HOUSING, MEDICAL CARE, AND HEATING?: NOT VERY HARD

## 2022-09-23 SDOH — HEALTH STABILITY: PHYSICAL HEALTH: ON AVERAGE, HOW MANY MINUTES DO YOU ENGAGE IN EXERCISE AT THIS LEVEL?: 90 MIN

## 2022-09-23 SDOH — HEALTH STABILITY: PHYSICAL HEALTH: ON AVERAGE, HOW MANY DAYS PER WEEK DO YOU ENGAGE IN MODERATE TO STRENUOUS EXERCISE (LIKE A BRISK WALK)?: 5 DAYS

## 2022-09-23 SDOH — ECONOMIC STABILITY: FOOD INSECURITY: WITHIN THE PAST 12 MONTHS, YOU WORRIED THAT YOUR FOOD WOULD RUN OUT BEFORE YOU GOT MONEY TO BUY MORE.: NEVER TRUE

## 2022-09-23 SDOH — ECONOMIC STABILITY: FOOD INSECURITY: WITHIN THE PAST 12 MONTHS, THE FOOD YOU BOUGHT JUST DIDN'T LAST AND YOU DIDN'T HAVE MONEY TO GET MORE.: NEVER TRUE

## 2022-09-23 SDOH — ECONOMIC STABILITY: TRANSPORTATION INSECURITY
IN THE PAST 12 MONTHS, HAS THE LACK OF TRANSPORTATION KEPT YOU FROM MEDICAL APPOINTMENTS OR FROM GETTING MEDICATIONS?: NO

## 2022-09-23 SDOH — ECONOMIC STABILITY: INCOME INSECURITY: IN THE LAST 12 MONTHS, WAS THERE A TIME WHEN YOU WERE NOT ABLE TO PAY THE MORTGAGE OR RENT ON TIME?: NO

## 2022-09-23 SDOH — ECONOMIC STABILITY: TRANSPORTATION INSECURITY
IN THE PAST 12 MONTHS, HAS LACK OF TRANSPORTATION KEPT YOU FROM MEETINGS, WORK, OR FROM GETTING THINGS NEEDED FOR DAILY LIVING?: NO

## 2022-09-23 ASSESSMENT — LIFESTYLE VARIABLES
AUDIT-C TOTAL SCORE: 1
HOW OFTEN DO YOU HAVE A DRINK CONTAINING ALCOHOL: MONTHLY OR LESS
SKIP TO QUESTIONS 9-10: 1
HOW OFTEN DO YOU HAVE SIX OR MORE DRINKS ON ONE OCCASION: NEVER
HOW MANY STANDARD DRINKS CONTAINING ALCOHOL DO YOU HAVE ON A TYPICAL DAY: 1 OR 2

## 2022-09-23 ASSESSMENT — ENCOUNTER SYMPTOMS
EYE PAIN: 0
FREQUENCY: 0
WEAKNESS: 0
DIZZINESS: 0
NAUSEA: 0
HEMATURIA: 0
HEADACHES: 0
ABDOMINAL PAIN: 0
JOINT SWELLING: 0
CONSTIPATION: 0
PALPITATIONS: 0
DYSURIA: 0
SORE THROAT: 0
NERVOUS/ANXIOUS: 0
DIARRHEA: 0
SHORTNESS OF BREATH: 0
ARTHRALGIAS: 1
PARESTHESIAS: 0
CHILLS: 0
HEMATOCHEZIA: 0
COUGH: 0
FEVER: 0
HEARTBURN: 0
MYALGIAS: 0

## 2022-09-23 ASSESSMENT — SOCIAL DETERMINANTS OF HEALTH (SDOH)
DO YOU BELONG TO ANY CLUBS OR ORGANIZATIONS SUCH AS CHURCH GROUPS UNIONS, FRATERNAL OR ATHLETIC GROUPS, OR SCHOOL GROUPS?: YES
HOW OFTEN DO YOU GET TOGETHER WITH FRIENDS OR RELATIVES?: ONCE A WEEK
IN A TYPICAL WEEK, HOW MANY TIMES DO YOU TALK ON THE PHONE WITH FAMILY, FRIENDS, OR NEIGHBORS?: NEVER
ARE YOU MARRIED, WIDOWED, DIVORCED, SEPARATED, NEVER MARRIED, OR LIVING WITH A PARTNER?: LIVING WITH PARTNER
HOW OFTEN DO YOU ATTEND CHURCH OR RELIGIOUS SERVICES?: NEVER

## 2022-09-23 ASSESSMENT — PAIN SCALES - GENERAL: PAINLEVEL: NO PAIN (0)

## 2022-09-23 NOTE — PROGRESS NOTES
SUBJECTIVE:   CC: Alo is an 27 year old who presents for preventative health visit.     Patient has been advised of split billing requirements and indicates understanding: Yes     Healthy Habits:     Getting at least 3 servings of Calcium per day:  NO    Bi-annual eye exam:  NO    Dental care twice a year:  Yes    Sleep apnea or symptoms of sleep apnea:  None    Diet:  Regular (no restrictions)    Frequency of exercise:  None    Taking medications regularly:  Yes    Medication side effects:  Not applicable    PHQ-2 Total Score: 0    Additional concerns today:  Yes      Bump near anus x 3 weeks. Has gotten a bit smaller since onset. Pain resolved. No itching. Bowel movements irregular at baseline, few times/week. Had straining for about a week but this started after noticing the bump. Denies blood in the stool or with wiping.     Post prandial symptoms after eating large meal. These include dizziness, nausea, heavy limbs, tingling in arms and legs. Resolves after 45-60 minutes. Has learned to manage through changing eating habits.      Today's PHQ-2 Score:   PHQ-2 ( 1999 [employer]) 9/23/2022   Q1: Little interest or pleasure in doing things 0   Q2: Feeling down, depressed or hopeless 0   PHQ-2 Score 0   PHQ-2 Total Score (12-17 Years)- Positive if 3 or more points; Administer PHQ-A if positive -   Q1: Little interest or pleasure in doing things Not at all   Q2: Feeling down, depressed or hopeless Not at all   PHQ-2 Score 0       Abuse: Current or Past(Physical, Sexual or Emotional)- No  Do you feel safe in your environment? Yes    Social History     Tobacco Use     Smoking status: Never Smoker     Smokeless tobacco: Never Used   Substance Use Topics     Alcohol use: Yes     Comment: very little         Alcohol Use 9/23/2022   Prescreen: >3 drinks/day or >7 drinks/week? No   Prescreen: >3 drinks/day or >7 drinks/week? -       Last PSA: No results found for: PSA    Reviewed orders with patient. Reviewed health  maintenance and updated orders accordingly - Yes  BP Readings from Last 3 Encounters:   09/23/22 110/60   09/07/21 110/60   08/21/20 108/60    Wt Readings from Last 3 Encounters:   09/23/22 60.8 kg (134 lb)   01/07/22 54.9 kg (121 lb)   10/21/21 54.4 kg (120 lb)            Reviewed and updated as needed this visit by clinical staff   Tobacco  Allergies               Reviewed and updated as needed this visit by Provider                   Patient Active Problem List   Diagnosis     Primary insomnia     ADHD (attention deficit hyperactivity disorder), combined type     Factor V Leiden mutation (H)     Past Medical History:   Diagnosis Date     Asperger's disorder      Factor V Leiden mutation (H)      Other insomnia      Past Surgical History:   Procedure Laterality Date     NO HISTORY OF SURGERY       Family History   Problem Relation Age of Onset     Factor V Leiden deficiency Father      Deep Vein Thrombosis Paternal Aunt      Colon Cancer No family hx of      Prostate Cancer No family hx of      Social History     Socioeconomic History     Marital status: Single     Spouse name: Not on file     Number of children: Not on file     Years of education: Not on file     Highest education level: Not on file   Occupational History     Not on file   Tobacco Use     Smoking status: Never Smoker     Smokeless tobacco: Never Used   Vaping Use     Vaping Use: Never used   Substance and Sexual Activity     Alcohol use: Yes     Comment: very little     Drug use: No     Sexual activity: Yes     Partners: Female   Other Topics Concern     Parent/sibling w/ CABG, MI or angioplasty before 65F 55M? 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 They are not officially .             Sofiya Mendez, DNP, APRN, CNP    06/03/22         Social Determinants of Health     Financial Resource Strain: Low Risk      Difficulty of Paying Living  Expenses: Not very hard   Food Insecurity: No Food Insecurity     Worried About Running Out of Food in the Last Year: Never true     Ran Out of Food in the Last Year: Never true   Transportation Needs: No Transportation Needs     Lack of Transportation (Medical): No     Lack of Transportation (Non-Medical): No   Physical Activity: Sufficiently Active     Days of Exercise per Week: 5 days     Minutes of Exercise per Session: 90 min   Stress: No Stress Concern Present     Feeling of Stress : Only a little   Social Connections: Moderately Isolated     Frequency of Communication with Friends and Family: Never     Frequency of Social Gatherings with Friends and Family: Once a week     Attends Yarsanism Services: Never     Active Member of Clubs or Organizations: Yes     Attends Club or Organization Meetings: Not on file     Marital Status: Living with partner   Intimate Partner Violence: Not on file   Housing Stability: Low Risk      Unable to Pay for Housing in the Last Year: No     Number of Places Lived in the Last Year: 2     Unstable Housing in the Last Year: No     Current Outpatient Medications   Medication     cloNIDine (CATAPRES) 0.3 MG tablet     MELATONIN     No current facility-administered medications for this visit.        Allergies   Allergen Reactions     Cats          Review of Systems   Constitutional: Negative for chills and fever.   HENT: Negative for congestion, ear pain, hearing loss and sore throat.    Eyes: Negative for pain and visual disturbance.   Respiratory: Negative for cough and shortness of breath.    Cardiovascular: Negative for chest pain, palpitations and peripheral edema.   Gastrointestinal: Negative for abdominal pain, constipation, diarrhea, heartburn, hematochezia and nausea.   Genitourinary: Negative for dysuria, frequency, genital sores, hematuria, impotence, penile discharge and urgency.   Musculoskeletal: Positive for arthralgias. Negative for joint swelling and myalgias.  "  Skin: Negative for rash.   Neurological: Negative for dizziness, weakness, headaches and paresthesias.   Psychiatric/Behavioral: Negative for mood changes. The patient is not nervous/anxious.        OBJECTIVE:   /60   Pulse 72   Temp 97  F (36.1  C) (Tympanic)   Resp 16   Ht 1.683 m (5' 6.25\")   Wt 60.8 kg (134 lb)   SpO2 97%   BMI 21.47 kg/m      Physical Exam  Constitutional: appears to be in no acute distress, comfortable, pleasant.   Eyes: anicteric, conjunctiva clear without drainage or erythema. ZELDA.   Ears, Nose and Throat: tympanic membranes gray with LR,  nose without nasal discharge. OP: no erythema to posterior pharynx, negative post nasal drainage, tonsils +1 no erythema or exudate.  Neck: supple, thyroid palpable,not enlarged, no nodules   Cardiovascular: regular rate and rhythm, normal S1 and S2, no murmurs, rubs or gallops, peripheral pulses full and symmetric; negative peripheral edema   Respiratory: Air entry throughout. Breathing pattern unlabored without the use of accessory muscles. Clear to auscultation A and P, no wheezes or crackles, normal breath sounds.    Gastrointestinal: rounded, soft. Positive bowel sounds x4, nontender, no masses.   : Small external hemorrhoid at 11 o'clock position.   Musculoskeletal: full range of motion, no edema.   Skin: pink, turgor smooth and elastic. Negative for lesions or dryness.  Neurological: normal gait, no tremor.   Psychological: appropriate mood and affect.   Lymphatic: no cervical, axillary, supraclavicular, or infraclavicular lymphadenopathy.      Diagnostic Test Results:  Labs reviewed in Epic    ASSESSMENT/PLAN:   (Z00.00) Routine general medical examination at a health care facility  (primary encounter diagnosis)  Age appropriate screening and preventative care have been addressed today. Vaccinations have been updated. Patient has been advised to undertake routine aerobic activity and they were counseled on healthy weight " "maintenance. Recommend annual vision exams as well as biannual dental exams. They will follow up for annual physical again in one year.     (F51.01) Primary insomnia  Stable. Continue clonidine, refilled.  - cloNIDine (CATAPRES) 0.3 MG tablet         (R11.0) Postprandial nausea  Post prandial symptoms after eating large meal. These include dizziness, nausea, heavy limbs, tingling in arms and legs. Resolves after 45-60 minutes. Has learned to manage through changing eating habits. Referral to GI for consult.  - Adult GI  Referral - Consult Only         (K64.4) External hemorrhoids  Small external hemorrhoid noted at 11 o'clock position. Reviewed lifestyle measures for prevention including management of constipation with increased water intake, high fiber diet, regular exercise. Continue to monitor for now, return to clinic as needed.    (D68.51) Factor V Leiden mutation (H)  Heterozygous for factor V.           COUNSELING:   Reviewed preventive health counseling, as reflected in patient instructions  Special attention given to:        Regular exercise       Healthy diet/nutrition       Vision screening       Immunizations      Estimated body mass index is 21.47 kg/m  as calculated from the following:    Height as of this encounter: 1.683 m (5' 6.25\").    Weight as of this encounter: 60.8 kg (134 lb).       He reports that he has never smoked. He has never used smokeless tobacco.      Counseling Resources:  ATP IV Guidelines  Pooled Cohorts Equation Calculator  FRAX Risk Assessment  ICSI Preventive Guidelines  Dietary Guidelines for Americans, 2010  USDA's MyPlate  ASA Prophylaxis  Lung CA Screening    PRAFUL Acosta CNP  [facility]  "

## 2022-10-10 ENCOUNTER — HEALTH MAINTENANCE LETTER (OUTPATIENT)
Age: 27
End: 2022-10-10

## 2022-11-14 NOTE — TELEPHONE ENCOUNTER
REFERRAL INFORMATION:    Referring Provider:  PRAFUL Acosta CNP     Referring Clinic:  [facility]     Reason for Visit/Diagnosis: Postprandial nausea     FUTURE VISIT INFORMATION:    Appointment Date: 2/10/2023    Appointment Time: 9:30 AM      NOTES STATUS DETAILS   OFFICE NOTE from Referring Provider Internal 9/23/2022 Office visit with PRAFUL Acosta CNP     OFFICE NOTE from Other Specialist N/A    HOSPITAL DISCHARGE SUMMARY/  ED VISITS N/A    OPERATIVE REPORT N/A    MEDICATION LIST Internal         ENDOSCOPY  N/A    COLONOSCOPY N/A    ERCP N/A    EUS N/A    STOOL TESTING N/A    PERTINENT LABS Internal    PATHOLOGY REPORTS (RELATED) N/A    IMAGING (CT, MRI, EGD, MRCP, Small Bowel Follow Through/SBT, MR/CT Enterography) N/A

## 2023-02-09 NOTE — PROGRESS NOTES
Video-Visit Details    Type of service:  Video Visit    Video Start Time (time video started): 9:13 AM      Video End Time (time video stopped): 10:10 AM    Originating Location (pt. Location): Home    Distant Location (provider location):  Off-site    Mode of Communication:  Video Conference via [facility]            GI CLINIC VISIT - NEW PATIENT    CC/REFERRING PROVIDER: PRAFUL Acosta CNP  REASON FOR CONSULTATION: Post-Prandial Nausea  HPI: 27 year old male with Factor V Leiden mutation, insomnia, short stature and growth hormone deficiency, and ADHD, presenting for post-prandial nausea.     He describes his symptoms as occurring in certain circumstances. 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 He says he used to eat and try to do his job, he would get light-headed and legs and arms would feel heavy. He would get nauseas and dizzy after eating. He learned to eat a lot to avoid it. He has had it happen only maybe a couple of times.    He has no vomiting. He has no abd pain with the episodes. He did not have syncope, but his vision did get a bit dark around the edges. He had syncope x1 in his life during a blood draw.     During the episode he cannot remember if he felt hot. When these episodes would happen he was eating food from the gas station and he would grab a breakfast sandwich or has browns, but he would also have a box of cheese-its to snack on. He's never had it happen when he's not working. And the episodes only occur when he's eating something physical after a meal. He would have to stop working and doing physical things and he would sit down and usually it would pass if he waited 15 min or so. He did not get chest pain with his episodes.     Now he will drink a can of mountain dew and he won't feel anything (not sugar free).     He has not had weight loss. He weighs around 120 lb and is 5 ft 6 in, and his weight has not changed for 15 years.     He does not get  reflux other than 1-2x in his life. He has had blood a couple of times during BM and sometimes he can see it in the stool and sometimes on wiping. He is not sure if the blood is on the surface of the stool or mixed in. The blood is red. He has not had melena.     He is not having chronic abd pain. He used to have BM every couple of days, but now he has a BM almost every day. Consistency is solid. He does not have straining with passing BM or pain with defecation.  He does not do regular physical activity.    He notes one time he was running in gym class and he felt off.      ROS: 10pt ROS performed and otherwise negative.    PERTINENT PAST MEDICAL HISTORY:  As noted above.  Past Medical History:   Diagnosis Date     Asperger's disorder      Factor V Leiden mutation (H)      Other insomnia         PREVIOUS ABDOMINAL/GYNECOLOGIC SURGERIES:  As noted above.  Past Surgical History:   Procedure Laterality Date     NO HISTORY OF SURGERY         PREVIOUS ENDOSCOPY:  No prior endoscopy    PERTINENT MEDICATIONS:  Current Outpatient Medications   Medication     cloNIDine (CATAPRES) 0.3 MG tablet     MELATONIN     No current facility-administered medications for this visit.      - Anticoagulation/Antiplatelet Agents: none  Medications reviewed with patient today, see Medication List/Assessment for details.    No other OTC/herbal/supplements reported by patient.    Occasional NSAID use for pain, he is unsure how often. He took some a few days ago because his jaw was hurting at the joint. He has a dentist appt coming up.     SOCIAL HISTORY:  Tobacco: none  Alcohol: a couple of times per year  Drugs: none  Marijuana: none  Right now works as a .     FAMILY HISTORY:  No colon/panc/esophageal/other GI CA, no other Carvajal or other HPS-related Carmela. No IBD/celiac, no other AI/liver/thyroid disease. No known FH bleeding/clotting disorders.  Family History   Problem Relation Age of Onset     Factor V Leiden deficiency  Father      Deep Vein Thrombosis Paternal Aunt      Colon Cancer No family hx of      Prostate Cancer No family hx of         PHYSICAL EXAMINATION:  Video visit  Gen: alert and oriented, in NAD and non-toxic appearing  HEENT: MMM, no scleral icterus  Resp: breathing comfortably on room air  Skin: No jaundice or visible rashes  Neuro: grossly intact    PERTINENT STUDIES:  Imaging  No abd imaging    Labs  TSH and T4 WNL  Hepatic Panel WNL    ASSESSMENT/PLAN:    #Nausea and dizziness with physical activity and a large meal  Possible etiologies include pulmonary arterial hypertension, arrythmia or congenital cardiac malformation given shunting of blood to the GI tract during digestion and decreased return of blood.    Another possible etiology includes dumping syndrome-like phenomenon, given growth hormone deficiency (growth hormone reduces uptake of glucose into muscles and acts to increase gluconeogenesis) as his symptoms occur after eating (increased insulin in response to glucose) and increase muscle use of glucose since symptoms only occur with physical activity.    Plan:  -recommended cardiology referral (pt wanted to hold off despite us strongly recommending this)  -endocrinology referral (pt agreed to this)    #Blood in stools  Pt notes happens with certain personal activities and he is typically able to attribute the bleeding to these. We discussed possibility of colorectal cancer--but he feels this is likely hemorrhoidal bleeding or due to injury. We recommended colonoscopy but he will defer. He has not had an updated hgb since 2009, so we suggested he get this drawn next time he is having blood work.    #CRC Screening  Start Colorectal Cancer Screening at age 45.      RTC prn (pt would like to hold off on scheduling a follow up), sooner if symptomatic.     Thank you for this consultation. It was a pleasure to participate in the care of this patient; please contact us with any further questions.    Pt was seen  and discussed with Dr. العراقي.    Annie Hawthorne MD PhD   Gastroenterology Fellow

## 2023-02-10 ENCOUNTER — VIRTUAL VISIT (OUTPATIENT)
Dept: GASTROENTEROLOGY | Facility: CLINIC | Age: 28
End: 2023-02-10
Attending: NURSE PRACTITIONER
Payer: COMMERCIAL

## 2023-02-10 ENCOUNTER — PRE VISIT (OUTPATIENT)
Dept: GASTROENTEROLOGY | Facility: CLINIC | Age: 28
End: 2023-02-10

## 2023-02-10 DIAGNOSIS — E23.0 GROWTH HORMONE DEFICIENCY (H): ICD-10-CM

## 2023-02-10 DIAGNOSIS — K62.5 RECTAL BLEEDING: Primary | ICD-10-CM

## 2023-02-10 DIAGNOSIS — R11.0 POSTPRANDIAL NAUSEA: ICD-10-CM

## 2023-02-10 PROCEDURE — 99205 OFFICE O/P NEW HI 60 MIN: CPT | Mod: VID | Performed by: STUDENT IN AN ORGANIZED HEALTH CARE EDUCATION/TRAINING PROGRAM

## 2023-02-10 ASSESSMENT — PAIN SCALES - GENERAL: PAINLEVEL: NO PAIN (0)

## 2023-02-10 NOTE — PATIENT INSTRUCTIONS
Dear Alo    It was nice to meet you.    As we discussed we would recommend a cardiology referral. Please let us know if you would like us to place this order.    I have referred you to endocrinology.    We discussed the possibility of colorectal cancer or inflammatory condition causing your rectal bleeding, but it is also possible that you could have hemorrhoidal bleeding or it is related to trauma. We would recommend a colonoscopy, but understand if you would not like to pursue one.    We are happy to see you anytime (please let us know if you would like a follow up).    Sincerely,  Dr Hawthorne

## 2023-02-10 NOTE — LETTER
2/10/2023         RE: Alo Gallegos  [address]  [place] [facility] [place] [address]        Dear Colleague,    Thank you for referring your patient, Alo Gallegos, to the [facility] [place]. Please see a copy of my visit note below.    GI CLINIC VISIT - NEW PATIENT    CC/REFERRING PROVIDER: PRAFUL Acosta CNP  REASON FOR CONSULTATION: Post-Prandial Nausea  HPI: 27 year old male with Factor V Leiden mutation, insomnia, short stature and growth hormone deficiency, and ADHD, presenting for post-prandial nausea.     He describes his symptoms as occurring in certain circumstances. 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 He says he used to eat and try to do his job, he would get light-headed and legs and arms would feel heavy. He would get nauseas and dizzy after eating. He learned to eat a lot to avoid it. He has had it happen only maybe a couple of times.    He has no vomiting. He has no abd pain with the episodes. He did not have syncope, but his vision did get a bit dark around the edges. He had syncope x1 in his life during a blood draw.     During the episode he cannot remember if he felt hot. When these episodes would happen he was eating food from the gas station and he would grab a breakfast sandwich or has browns, but he would also have a box of cheese-its to snack on. He's never had it happen when he's not working. And the episodes only occur when he's eating something physical after a meal. He would have to stop working and doing physical things and he would sit down and usually it would pass if he waited 15 min or so. He did not get chest pain with his episodes.     Now he will drink a can of mountain dew and he won't feel anything (not sugar free).     He has not had weight loss. He weighs around 120 lb and is 5 ft 6 in, and his weight has not changed for 15 years.     He does not get reflux other than 1-2x in his life. He has had  blood a couple of times during BM and sometimes he can see it in the stool and sometimes on wiping. He is not sure if the blood is on the surface of the stool or mixed in. The blood is red. He has not had melena.     He is not having chronic abd pain. He used to have BM every couple of days, but now he has a BM almost every day. Consistency is solid. He does not have straining with passing BM or pain with defecation.  He does not do regular physical activity.    He notes one time he was running in gym class and he felt off.      ROS: 10pt ROS performed and otherwise negative.    PERTINENT PAST MEDICAL HISTORY:  As noted above.  Past Medical History:   Diagnosis Date     Asperger's disorder      Factor V Leiden mutation (H)      Other insomnia         PREVIOUS ABDOMINAL/GYNECOLOGIC SURGERIES:  As noted above.  Past Surgical History:   Procedure Laterality Date     NO HISTORY OF SURGERY         PREVIOUS ENDOSCOPY:  No prior endoscopy    PERTINENT MEDICATIONS:  Current Outpatient Medications   Medication     cloNIDine (CATAPRES) 0.3 MG tablet     MELATONIN     No current facility-administered medications for this visit.      - Anticoagulation/Antiplatelet Agents: none  Medications reviewed with patient today, see Medication List/Assessment for details.    No other OTC/herbal/supplements reported by patient.    Occasional NSAID use for pain, he is unsure how often. He took some a few days ago because his jaw was hurting at the joint. He has a dentist appt coming up.     SOCIAL HISTORY:  Tobacco: none  Alcohol: a couple of times per year  Drugs: none  Marijuana: none  Right now works as a .     FAMILY HISTORY:  No colon/panc/esophageal/other GI CA, no other Carvajal or other HPS-related Carmela. No IBD/celiac, no other AI/liver/thyroid disease. No known FH bleeding/clotting disorders.  Family History   Problem Relation Age of Onset     Factor V Leiden deficiency Father      Deep Vein Thrombosis Paternal Aunt       Colon Cancer No family hx of      Prostate Cancer No family hx of         PHYSICAL EXAMINATION:  Video visit  Gen: alert and oriented, in NAD and non-toxic appearing  HEENT: MMM, no scleral icterus  Resp: breathing comfortably on room air  Skin: No jaundice or visible rashes  Neuro: grossly intact    PERTINENT STUDIES:  Imaging  No abd imaging    Labs  TSH and T4 WNL  Hepatic Panel WNL    ASSESSMENT/PLAN:    #Nausea and dizziness with physical activity and a large meal  Possible etiologies include pulmonary arterial hypertension, arrythmia or congenital cardiac malformation given shunting of blood to the GI tract during digestion and decreased return of blood.    Another possible etiology includes dumping syndrome-like phenomenon, given growth hormone deficiency (growth hormone reduces uptake of glucose into muscles and acts to increase gluconeogenesis) as his symptoms occur after eating (increased insulin in response to glucose) and increase muscle use of glucose since symptoms only occur with physical activity.    Plan:  -recommended cardiology referral (pt wanted to hold off despite us strongly recommending this)  -endocrinology referral (pt agreed to this)    #Blood in stools  Pt notes happens with certain personal activities and he is typically able to attribute the bleeding to these. We discussed possibility of colorectal cancer--but he feels this is likely hemorrhoidal bleeding or due to injury. We recommended colonoscopy but he will defer. He has not had an updated hgb since 2009, so we suggested he get this drawn next time he is having blood work.    #CRC Screening  Start Colorectal Cancer Screening at age 45.      RTC prn (pt would like to hold off on scheduling a follow up), sooner if symptomatic.     Thank you for this consultation. It was a pleasure to participate in the care of this patient; please contact us with any further questions.    Pt was seen and discussed with Dr. العراقي.    Annie  MD Marine PhD   Gastroenterology Fellow            Attestation signed by Maritza العراقي MD at 3/3/2023 10:38 AM:  I participated in a video visit and discussed the management with the GI Fellow, Dr Hawthorne on 2/10/2023. I reviewed the note and there are no changes to the past medical, family or social history. I agree with the documented findings and plan of care as outlined. A total of 60 minutes spent on the date of the encounter doing chart review, history and limited exam, documentation and further activities as noted above.     Maritza العراقي MD  Gastroenterology         Sincerely,    Annie Hawthorne MD

## 2023-02-10 NOTE — NURSING NOTE
Is the patient currently in the state of [place]? YES    Visit mode:VIDEO    If the visit is dropped, the patient can be reconnected by: VIDEO VISIT: Send to e-mail at: [email or web address]    Will anyone else be joining the visit? NO      How would you like to obtain your AVS? MyChart    Are changes needed to the allergy or medication list? NO    Comments or concerns regarding today's visit:

## 2023-07-28 DIAGNOSIS — F51.01 PRIMARY INSOMNIA: ICD-10-CM

## 2023-07-28 NOTE — TELEPHONE ENCOUNTER
Medication Question or Refill    Contacts         Type Contact Phone/Fax    07/28/2023 12:41 PM CDT Phone (Incoming) Alo Gallegos (Self) [phone] (M)            What medication are you calling about (include dose and sig)?: clonidin, .3 milligram    This SmartLink requires parameters for processing. Parameters are variables that can be added to the original SmartLink name. This allows the user to request specific information by giving the SmartLink precise direction.    The 30DAYALB SmartLink accepts a list of result component base names  by commas. Ex. .30DAYALB[HDL,LDL,CHOL    The SmartLink will display a configurable table that can contain the name of the components along with a current result, current result reference range, previous result, and/or previous result reference range within a defined lookback period. g    Preferred Pharmacy:   [id number] - [place], [place] - [address]  [id number]  [address] [facility] [address]  Phone: [phone] Fax: [phone]      Controlled Substance Agreement on file:   CSA -- Patient Level:    CSA: None found at the patient level.       Who prescribed the medication?: PCP    Do you need a refill? Yes    When did you use the medication last? Today    Patient offered an appointment? No    Do you have any questions or concerns?  Yes: Patient just got last medication refill, will need new prescription before January, which is soonest available for PCP. Would also like to schedule appt      Could we send this information to you in [facility] or would you prefer to receive a phone call?:   Patient would prefer a phone call   Okay to leave a detailed message?: Yes at Cell number on file:    Telephone Information:   Mobile [phone]

## 2023-08-01 RX ORDER — CLONIDINE HYDROCHLORIDE 0.3 MG/1
0.3 TABLET ORAL DAILY
Qty: 30 TABLET | Refills: 11 | OUTPATIENT
Start: 2023-08-01

## 2023-08-02 RX ORDER — CLONIDINE HYDROCHLORIDE 0.3 MG/1
0.3 TABLET ORAL DAILY
Qty: 30 TABLET | Refills: 4 | Status: SHIPPED | OUTPATIENT
Start: 2023-08-02 | End: 2023-08-18

## 2023-08-13 NOTE — PROGRESS NOTES
"Name: Alo Gallegos is a 28 year old man, seen at the request of Dr. Annie Hawthorne for evaluation of     Chief Complaint   Patient presents with    Endocrine Problem       HPI:  Recent issues:  Here for evaluation of \"postprandial nausea\"  Reviewed medical history from patient and Epic chart record        Patient previously worked as power line   Noticed symptoms at work which would occur after eating a large meal and then strenuous (work) exercise  Spells have occurred 6-7x (total), with the initial spell occurring 3.5 yrs ago.   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 Annie Hawthorne/[facility].    Concern for dumping syndrome, no additional testing.  Endocrinology referral.      Growth Hormone Def:  2009. Diagnosed with short stature  Endocrinology go with Dr. Ronnie Rodriguez/[facility] [place]  Recalls baseline weight near 4 ft 10 in  Provacative GH stim testing reportedly showed GH deficency  Previous treatment with GH medication and patient height of 5 ft 6 in, just below his midparental height.   2012. GH med discontinued.      Previous [facility] labs include:   Latest Reference Range & Units 09/09/11 16:03   Hemoglobin A1C 4.3 - 6.0 % 5.1      12/04/09 15:15 02/12/10 09:00 07/30/10 15:50 03/11/11 16:22 05/20/11 15:50 09/09/11 16:03 10/05/12 16:13   Insulin Growth Factor 1 SD Score NEG 2.2 NEG 1.9 1.1 2.4 1.0 1.9 0.8     Chronic illnesses include:   Insomnia:   Takes catapress med, followed by " PCP   ADHD:     Followed by PCP   Factor V Leiden mut: Fowed by PCP      Lives in [place], [place]  Sees Sofiya Mendez CNP/[facility] for general medicine evaluations.    PMH/PSH:  Past Medical History:   Diagnosis Date    ADHD (attention deficit hyperactivity disorder)     Asperger's disorder     External hemorrhoids     Factor V Leiden mutation (H)     Growth hormone deficiency (H) 2009    treatment wh GH medication    Insomnia      Past Surgical History:   Procedure Laterality Date    NO HISTORY OF SURGERY         Family Hx:  Family History   Problem Relation Age of Onset    Factor V Leiden deficiency Father     Deep Vein Thrombosis Paternal Aunt     Colon Cancer No family hx of     Prostate Cancer No family hx of          Social Hx:  Social History     Socioeconomic History    Marital status: Single     Spouse name: Not on file    Number of children: Not on file    Years of education: Not on file    Highest education level: Not on file   Occupational History    Not on file   Tobacco Use    Smoking status: Never     Passive exposure: Never    Smokeless tobacco: Never   Vaping Use    Vaping Use: Never used   Substance and Sexual Activity    Alcohol use: Yes     Comment: very little    Drug use: No    Sexual activity: Yes     Partners: Female   Other Topics Concern    Parent/sibling w/ CABG, MI or angioplasty before 65F 55M? 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 They are not officially .             Sofiya Mendez, DNP, APRN, CNP    06/03/22         Social Determinants of Health     Financial Resource Strain: Low Risk  (9/23/2022)    Overall Financial Resource Strain (CARDIA)     Difficulty of Paying Living Expenses: Not very hard   Food Insecurity: No Food Insecurity (9/23/2022)    Hunger Vital Sign     Worried About Running Out of Food in the Last Year: Never true     Ran Out of Food in the Last  Year: Never true   Transportation Needs: No Transportation Needs (9/23/2022)    PRAPARE - Transportation     Lack of Transportation (Medical): No     Lack of Transportation (Non-Medical): No   Physical Activity: Sufficiently Active (9/23/2022)    Exercise Vital Sign     Days of Exercise per Week: 5 days     Minutes of Exercise per Session: 90 min   Stress: No Stress Concern Present (9/23/2022)    [place] [place] [employer] - Occupational Stress Questionnaire     Feeling of Stress : Only a little   Social Connections: Moderately Isolated (9/23/2022)    Social Connection and Isolation Panel [NHANES]     Frequency of Communication with Friends and Family: Never     Frequency of Social Gatherings with Friends and Family: Once a week     Attends Confucianism Services: Never     Active Member of Clubs or Organizations: Yes     Attends Club or Organization Meetings: Not on file     Marital Status: Living with partner   Intimate Partner Violence: Not on file   Housing Stability: Low Risk  (9/23/2022)    Housing Stability Vital Sign     Unable to Pay for Housing in the Last Year: No     Number of Places Lived in the Last Year: 2     Unstable Housing in the Last Year: No          MEDICATIONS:  has a current medication list which includes the following prescription(s): clonidine and melatonin.    ROS:     ROS: 10 point ROS neg other than the symptoms noted above in the HPI.    GENERAL: no recent fatigue, wt stable; denies fevers, chills, night sweats.    HEENT: no dysphagia, odonophagia, diplopia, neck pain  THYROID:  no apparent hyper or hypothyroid symptoms  CV: no chest pain, pressure, palpitations  LUNGS: no SOB, GARCIA, cough, wheezing   ABDOMEN: no diarrhea, constipation, abdominal pain  EXTREMITIES: no rashes, ulcers, edema  NEUROLOGY: no headaches, denies changes in vision, tingling, extremitiy numbness   MSK: occasional hip and back pains; denies muscle weakness  SKIN: no rashes or lesions  : no dysuria,  "nocturia  PSYCH:  chronic insomnia condition treated with medication; stable mood, no significant anxiety or depression  ENDOCRINE: no heat or cold intolerance    Physical Exam   VS: /82   Pulse 67   Ht 1.676 m (5' 6\")   Wt 59.1 kg (130 lb 6.4 oz)   BMI 21.05 kg/m    GENERAL: AXOX3, NAD, slender, well dressed, answering questions appropriately, appears stated age.  THYROID:  normal gland, no apparent nodules or goiter  HEENT: neck non-tender, no exopthalmous, no proptosis, EOMI  CV: RRR, no rubs, gallops, no murmurs  LUNGS: CTAB, no wheezes, rales, or ronchi  ABDOMEN: soft, nontender, nondistended  EXTREMITIES: no edema, no lesions  NEUROLOGY: CN grossly intact, no tremors  MSK: grossly intact  SKIN: no rashes, no lesions    LABS:    All pertinent notes, labs, and images personally reviewed by me.     A/P:  Encounter Diagnoses   Name Primary?    Spell of generalized weakness Yes    Growth hormone deficiency (H)        Comments:   Reviewed health history and symptoms  No health history of symptoms to indicate a digestion problem or postprandial nausea  Spells had been very infrequent and none in since 2022.  Patient feeling well overall  Prior history of pituitary GH deficiency, unclear if this has been assessed in recent years    Plan:  Discussed general issues with pituitary gland diseases and management  Reviewed pituitary gland anatomy and hormone physiology  Discussed lab tests used to assess patient pituitary-axis hormone levels    Recommend:  No recent symptoms related to food digestion, paresthesias, weakness... no concerns about recent food related endocrine disease  Previous spells may relate to postprandial hypoglycemia due to meal carb hyperglycemia and hyperinsulinemia, though no data to confirm this  Reassurance given    Discussed importance to monitor for spells after higher carb meals followed by aerobic exercise  Consider doing a high carb meal prior to aerobic exercise... see if spells " re-occur  If recurrence of spells, consider wearing a CGM sensor such as Freestyle Libre2 or 3 sensor.  Discussed option of future growth hormone related and cortisol lab testing   Plan to have several labs at his 1/2024 PCP appointment, lab orders placed  Keep focus on diet, exercise, weight management  Contact me if questions about this evaluation    Addressed patient questions today    There are no Patient Instructions on file for this visit.    Future labs ordered today:   Orders Placed This Encounter   Procedures    Cortisol    Insulin Growth Factor 1 by Immunoassay    Glucose    Hemoglobin A1c     Radiology/Consults ordered today:     Total time spent on day of encounter:  48 min    Follow-up:  benji Lisa MD, MS  Endocrinology  [facility]    CC: Sofiya Mendez

## 2023-08-14 ENCOUNTER — OFFICE VISIT (OUTPATIENT)
Dept: ENDOCRINOLOGY | Facility: CLINIC | Age: 28
End: 2023-08-14
Attending: STUDENT IN AN ORGANIZED HEALTH CARE EDUCATION/TRAINING PROGRAM
Payer: COMMERCIAL

## 2023-08-14 VITALS
SYSTOLIC BLOOD PRESSURE: 124 MMHG | DIASTOLIC BLOOD PRESSURE: 82 MMHG | HEIGHT: 66 IN | HEART RATE: 67 BPM | WEIGHT: 130.4 LBS | BODY MASS INDEX: 20.96 KG/M2

## 2023-08-14 DIAGNOSIS — R53.1 SPELL OF GENERALIZED WEAKNESS: Primary | ICD-10-CM

## 2023-08-14 DIAGNOSIS — E23.0 GROWTH HORMONE DEFICIENCY (H): ICD-10-CM

## 2023-08-14 PROCEDURE — 99244 OFF/OP CNSLTJ NEW/EST MOD 40: CPT | Performed by: INTERNAL MEDICINE

## 2023-08-14 NOTE — LETTER
"    8/14/2023         RE: Alo Gallegos  [address] [facility] [address]        Dear Colleague,    Thank you for referring your patient, Alo Gallegos, to the [facility] [place]. Please see a copy of my visit note below.    Name: Alo Gallegos is a 28 year old man, seen at the request of Dr. Annie Hawthorne for evaluation of     Chief Complaint   Patient presents with     Endocrine Problem       HPI:  Recent issues:  Here for evaluation of \"postprandial nausea\"  Reviewed medical history from patient and Epic chart record        Patient previously worked as power line   Noticed symptoms at work which would occur after eating a large meal and then strenuous (work) exercise  Spells have occurred 6-7x (total), with the initial spell occurring 3.5 yrs ago.   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 nAnie Hawthorne/[facility].    Concern for dumping syndrome, no additional testing.  Endocrinology referral.      Growth Hormone Def:  2009. Diagnosed with short stature  Endocrinology go with Dr. Ronnie Rodriguez/[facility]  Recalls baseline weight near 4 ft 10 in  Provacative GH stim testing reportedly showed GH deficency  Previous treatment with GH medication and patient height of 5 ft 6 in, just below his midparental height.   2012. GH med discontinued.      Previous [facility] labs include:   Latest Reference Range & Units 09/09/11 16:03   Hemoglobin A1C 4.3 - 6.0 % 5.1      " 12/04/09 15:15 02/12/10 09:00 07/30/10 15:50 03/11/11 16:22 05/20/11 15:50 09/09/11 16:03 10/05/12 16:13   Insulin Growth Factor 1 SD Score NEG 2.2 NEG 1.9 1.1 2.4 1.0 1.9 0.8     Chronic illnesses include:   Insomnia:   Takes catapress med, followed by PCP   ADHD:     Followed by PCP   Factor V Leiden mut: Fowed by PCP      Lives in [place], [place]  Sees Sofiya Mendez CNP/[facility] for general medicine evaluations.    PMH/PSH:  Past Medical History:   Diagnosis Date     ADHD (attention deficit hyperactivity disorder)      Asperger's disorder      External hemorrhoids      Factor V Leiden mutation (H)      Growth hormone deficiency (H) 2009    treatment wh GH medication     Insomnia      Past Surgical History:   Procedure Laterality Date     NO HISTORY OF SURGERY         Family Hx:  Family History   Problem Relation Age of Onset     Factor V Leiden deficiency Father      Deep Vein Thrombosis Paternal Aunt      Colon Cancer No family hx of      Prostate Cancer No family hx of          Social Hx:  Social History     Socioeconomic History     Marital status: Single     Spouse name: Not on file     Number of children: Not on file     Years of education: Not on file     Highest education level: Not on file   Occupational History     Not on file   Tobacco Use     Smoking status: Never     Passive exposure: Never     Smokeless tobacco: Never   Vaping Use     Vaping Use: Never used   Substance and Sexual Activity     Alcohol use: Yes     Comment: very little     Drug use: No     Sexual activity: Yes     Partners: Female   Other Topics Concern     Parent/sibling w/ CABG, MI or angioplasty before 65F 55M? 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 They are not officially .             Sofiya Mendez, DNP, APRN, CNP    06/03/22         Social Determinants of Health     Financial Resource Strain: Low Risk  (9/23/2022)     Overall Financial Resource Strain (CARDIA)      Difficulty of Paying Living Expenses: Not very hard   Food Insecurity: No Food Insecurity (9/23/2022)    Hunger Vital Sign      Worried About Running Out of Food in the Last Year: Never true      Ran Out of Food in the Last Year: Never true   Transportation Needs: No Transportation Needs (9/23/2022)    PRAPARE - Transportation      Lack of Transportation (Medical): No      Lack of Transportation (Non-Medical): No   Physical Activity: Sufficiently Active (9/23/2022)    Exercise Vital Sign      Days of Exercise per Week: 5 days      Minutes of Exercise per Session: 90 min   Stress: No Stress Concern Present (9/23/2022)    [employer] [place] [employer]ational [employer] - Occupational Stress Questionnaire      Feeling of Stress : Only a little   Social Connections: Moderately Isolated (9/23/2022)    Social Connection and Isolation Panel [NHANES]      Frequency of Communication with Friends and Family: Never      Frequency of Social Gatherings with Friends and Family: Once a week      Attends Taoist Services: Never      Active Member of Clubs or Organizations: Yes      Attends Club or Organization Meetings: Not on file      Marital Status: Living with partner   Intimate Partner Violence: Not on file   Housing Stability: Low Risk  (9/23/2022)    Housing Stability Vital Sign      Unable to Pay for Housing in the Last Year: No      Number of Places Lived in the Last Year: 2      Unstable Housing in the Last Year: No          MEDICATIONS:  has a current medication list which includes the following prescription(s): clonidine and melatonin.    ROS:     ROS: 10 point ROS neg other than the symptoms noted above in the HPI.    GENERAL: no recent fatigue, wt stable; denies fevers, chills, night sweats.    HEENT: no dysphagia, odonophagia, diplopia, neck pain  THYROID:  no apparent hyper or hypothyroid symptoms  CV: no chest pain, pressure, palpitations  LUNGS: no SOB, GARCIA, cough,  "wheezing   ABDOMEN: no diarrhea, constipation, abdominal pain  EXTREMITIES: no rashes, ulcers, edema  NEUROLOGY: no headaches, denies changes in vision, tingling, extremitiy numbness   MSK: occasional hip and back pains; denies muscle weakness  SKIN: no rashes or lesions  : no dysuria, nocturia  PSYCH:  chronic insomnia condition treated with medication; stable mood, no significant anxiety or depression  ENDOCRINE: no heat or cold intolerance    Physical Exam   VS: /82   Pulse 67   Ht 1.676 m (5' 6\")   Wt 59.1 kg (130 lb 6.4 oz)   BMI 21.05 kg/m    GENERAL: AXOX3, NAD, slender, well dressed, answering questions appropriately, appears stated age.  THYROID:  normal gland, no apparent nodules or goiter  HEENT: neck non-tender, no exopthalmous, no proptosis, EOMI  CV: RRR, no rubs, gallops, no murmurs  LUNGS: CTAB, no wheezes, rales, or ronchi  ABDOMEN: soft, nontender, nondistended  EXTREMITIES: no edema, no lesions  NEUROLOGY: CN grossly intact, no tremors  MSK: grossly intact  SKIN: no rashes, no lesions    LABS:    All pertinent notes, labs, and images personally reviewed by me.     A/P:  Encounter Diagnoses   Name Primary?     Spell of generalized weakness Yes     Growth hormone deficiency (H)        Comments:   Reviewed health history and symptoms  No health history of symptoms to indicate a digestion problem or postprandial nausea  Spells had been very infrequent and none in since 2022.  Patient feeling well overall  Prior history of pituitary GH deficiency, unclear if this has been assessed in recent years    Plan:  Discussed general issues with pituitary gland diseases and management  Reviewed pituitary gland anatomy and hormone physiology  Discussed lab tests used to assess patient pituitary-axis hormone levels    Recommend:  No recent symptoms related to food digestion, paresthesias, weakness... no concerns about recent food related endocrine disease  Previous spells may relate to postprandial " hypoglycemia due to meal carb hyperglycemia and hyperinsulinemia, though no data to confirm this  Reassurance given    Discussed importance to monitor for spells after higher carb meals followed by aerobic exercise  Consider doing a high carb meal prior to aerobic exercise... see if spells re-occur  If recurrence of spells, consider wearing a CGM sensor such as Freestyle Libre2 or 3 sensor.  Discussed option of future growth hormone related and cortisol lab testing   Plan to have several labs at his 1/2024 PCP appointment, lab orders placed  Keep focus on diet, exercise, weight management  Contact me if questions about this evaluation    Addressed patient questions today    There are no Patient Instructions on file for this visit.    Future labs ordered today:   Orders Placed This Encounter   Procedures     Cortisol     Insulin Growth Factor 1 by Immunoassay     Glucose     Hemoglobin A1c     Radiology/Consults ordered today:     Total time spent on day of encounter:  48 min    Follow-up:  benji Lisa MD, MS  Endocrinology  [facility]    CC: Sofiya Mendez       Again, thank you for allowing me to participate in the care of your patient.        Sincerely,        Ben Lisa MD

## 2023-08-18 ENCOUNTER — VIRTUAL VISIT (OUTPATIENT)
Dept: PEDIATRICS | Facility: CLINIC | Age: 28
End: 2023-08-18
Payer: COMMERCIAL

## 2023-08-18 DIAGNOSIS — F51.01 PRIMARY INSOMNIA: ICD-10-CM

## 2023-08-18 PROCEDURE — 99213 OFFICE O/P EST LOW 20 MIN: CPT | Mod: 95 | Performed by: INTERNAL MEDICINE

## 2023-08-18 RX ORDER — CLONIDINE HYDROCHLORIDE 0.3 MG/1
0.3 TABLET ORAL DAILY
Qty: 90 TABLET | Refills: 3 | Status: SHIPPED | OUTPATIENT
Start: 2023-08-18 | End: 2024-07-12

## 2023-10-29 ENCOUNTER — HEALTH MAINTENANCE LETTER (OUTPATIENT)
Age: 28
End: 2023-10-29

## 2024-07-07 SDOH — HEALTH STABILITY: PHYSICAL HEALTH: ON AVERAGE, HOW MANY MINUTES DO YOU ENGAGE IN EXERCISE AT THIS LEVEL?: 30 MIN

## 2024-07-07 SDOH — HEALTH STABILITY: PHYSICAL HEALTH: ON AVERAGE, HOW MANY DAYS PER WEEK DO YOU ENGAGE IN MODERATE TO STRENUOUS EXERCISE (LIKE A BRISK WALK)?: 5 DAYS

## 2024-07-07 ASSESSMENT — SOCIAL DETERMINANTS OF HEALTH (SDOH): HOW OFTEN DO YOU GET TOGETHER WITH FRIENDS OR RELATIVES?: ONCE A WEEK

## 2024-07-12 ENCOUNTER — OFFICE VISIT (OUTPATIENT)
Dept: PEDIATRICS | Facility: CLINIC | Age: 29
End: 2024-07-12
Payer: COMMERCIAL

## 2024-07-12 VITALS
TEMPERATURE: 98.6 F | SYSTOLIC BLOOD PRESSURE: 123 MMHG | OXYGEN SATURATION: 97 % | DIASTOLIC BLOOD PRESSURE: 73 MMHG | HEIGHT: 67 IN | WEIGHT: 133.9 LBS | BODY MASS INDEX: 21.02 KG/M2 | RESPIRATION RATE: 16 BRPM | HEART RATE: 72 BPM

## 2024-07-12 DIAGNOSIS — Z00.00 ENCOUNTER FOR PREVENTATIVE ADULT HEALTH CARE EXAMINATION: Primary | ICD-10-CM

## 2024-07-12 DIAGNOSIS — F51.01 PRIMARY INSOMNIA: ICD-10-CM

## 2024-07-12 PROCEDURE — 99213 OFFICE O/P EST LOW 20 MIN: CPT | Mod: 25 | Performed by: NURSE PRACTITIONER

## 2024-07-12 PROCEDURE — 99395 PREV VISIT EST AGE 18-39: CPT | Performed by: NURSE PRACTITIONER

## 2024-07-12 RX ORDER — CLONIDINE HYDROCHLORIDE 0.3 MG/1
0.3 TABLET ORAL DAILY
Qty: 90 TABLET | Refills: 3 | Status: SHIPPED | OUTPATIENT
Start: 2024-08-16 | End: 2024-09-26

## 2024-07-12 ASSESSMENT — PAIN SCALES - GENERAL: PAINLEVEL: MILD PAIN (3)

## 2024-07-12 NOTE — PROGRESS NOTES
"Preventive Care Visit  [facility] PRAFUL Cobian CNP, Family Medicine  Jul 12, 2024      Assessment & Plan     Encounter for preventative adult health care examination  Age appropriate screening and preventative care have been addressed today. Vaccinations have been reviewed. Patient has been advised to undertake routine aerobic activity and they were counseled on healthy weight maintenance. Recommend annual vision exams as well as biannual dental exams. They will follow up for annual physical again in one year.     Primary insomnia  Chronic, stable. Taking clonidine as prescribed, no concerns. Due for refills next month. Will update prescription with future refill date.  - cloNIDine (CATAPRES) 0.3 MG tablet; Take 1 tablet (0.3 mg) by mouth daily  - OFFICE/OUTPT VISIT,ESPERANZA LÓPEZ III      Counseling  Appropriate preventive services were discussed with this patient, including applicable screening as appropriate for fall prevention, nutrition, physical activity, Tobacco-use cessation, weight loss and cognition.  Checklist reviewing preventive services available has been given to the patient.  Reviewed patient's diet, addressing concerns and/or questions.       Jono Prajapati is a 29 year old, presenting for the following:  Physical        7/12/2024    10:58 AM   Additional Questions   Roomed by Rowena VEGA   Accompanied by None         7/12/2024    10:58 AM   Patient Reported Additional Medications   Patient reports taking the following new medications None            HPI    Has needed PT for right hip due to \"bone deformities,\" which has led to knee pain. Generally right knee, sometimes his left. PT is also addressing this. Has been getting PT through outside clinic ([facility]), doing better.     Started going to chiro for back pain resulting from an ill fitting saddle. Back pain gone x 1.5 years, although has continued to see chiro on regular basis.     5-8k steps daily " through work.     Wt Readings from Last 10 Encounters:   07/12/24 60.7 kg (133 lb 14.4 oz)   08/14/23 59.1 kg (130 lb 6.4 oz)   09/23/22 60.8 kg (134 lb)   01/07/22 54.9 kg (121 lb)   10/21/21 54.4 kg (120 lb)   09/07/21 57 kg (125 lb 11.2 oz)   08/21/20 57.5 kg (126 lb 11.2 oz)   08/23/19 54.4 kg (119 lb 14.4 oz)   08/17/18 56 kg (123 lb 8 oz)   09/26/17 53.5 kg (118 lb)     Since changing jobs, the dizziness/nausea episodes have resolved. His new job is far less physically demanding.        7/7/2024   General Health   How would you rate your overall physical health? (!) FAIR   Feel stress (tense, anxious, or unable to sleep) Not at all            7/7/2024   Nutrition   Three or more servings of calcium each day? (!) NO   Diet: Regular (no restrictions)   How many servings of fruit and vegetables per day? (!) 0-1   How many sweetened beverages each day? 0-1            7/7/2024   Exercise   Days per week of moderate/strenous exercise 5 days   Average minutes spent exercising at this level 30 min            7/7/2024   Social Factors   Frequency of gathering with friends or relatives Once a week   Worry food won't last until get money to buy more No   Food not last or not have enough money for food? No   Do you have housing? (Housing is defined as stable permanent housing and does not include staying ouside in a car, in a tent, in an abandoned building, in an overnight shelter, or couch-surfing.) Yes   Are you worried about losing your housing? No   Lack of transportation? No   Unable to get utilities (heat,electricity)? No            7/7/2024   Dental   Dentist two times every year? Yes            7/7/2024   TB Screening   Were you born outside of the [place]? No            Today's PHQ-2 Score:       7/12/2024    10:56 AM   PHQ-2 ( 1999 [employer])   Q1: Little interest or pleasure in doing things 0   Q2: Feeling down, depressed or hopeless 0   PHQ-2 Score 0   Q1: Little interest or pleasure in doing things Not at all   Q2:  "Feeling down, depressed or hopeless Not at all   PHQ-2 Score 0           7/7/2024   Substance Use   Alcohol more than 3/day or more than 7/wk No   Do you use any other substances recreationally? No        Social History     Tobacco Use    Smoking status: Never     Passive exposure: Never    Smokeless tobacco: Never   Vaping Use    Vaping status: Never Used   Substance Use Topics    Alcohol use: Yes     Comment: very little    Drug use: No           7/7/2024   STI Screening   New sexual partner(s) since last STI/HIV test? No            7/7/2024   Contraception/Family Planning   Questions about contraception or family planning No           Reviewed and updated as needed this visit by Provider                    Patient Active Problem List   Diagnosis    Primary insomnia    ADHD (attention deficit hyperactivity disorder), combined type    Factor V Leiden mutation (H24)     Past Surgical History:   Procedure Laterality Date    NO HISTORY OF SURGERY         Social History     Tobacco Use    Smoking status: Never     Passive exposure: Never    Smokeless tobacco: Never   Substance Use Topics    Alcohol use: Yes     Comment: very little     Family History   Problem Relation Age of Onset    Factor V Leiden deficiency Father     Deep Vein Thrombosis Paternal Aunt     Colon Cancer No family hx of     Prostate Cancer No family hx of               Objective    Exam  /73 (BP Location: Right arm, Patient Position: Sitting, Cuff Size: Adult Regular)   Pulse 72   Temp 98.6  F (37  C) (Tympanic)   Resp 16   Ht 1.689 m (5' 6.5\")   Wt 60.7 kg (133 lb 14.4 oz)   SpO2 97%   BMI 21.29 kg/m     Estimated body mass index is 21.29 kg/m  as calculated from the following:    Height as of this encounter: 1.689 m (5' 6.5\").    Weight as of this encounter: 60.7 kg (133 lb 14.4 oz).    Physical Exam  Constitutional: appears to be in no acute distress, comfortable, pleasant.   Eyes: anicteric, conjunctiva clear without drainage or " erythema. ZELDA.   Ears, Nose and Throat: tympanic membranes gray with LR,  nose without nasal discharge. OP: no erythema to posterior pharynx, negative post nasal drainage, tonsils +1 no erythema or exudate.  Neck: supple, thyroid palpable,not enlarged, no nodules   Cardiovascular: regular rate and rhythm, normal S1 and S2, no murmurs, rubs or gallops, peripheral pulses full and symmetric; negative peripheral edema   Respiratory: Air entry throughout. Breathing pattern unlabored without the use of accessory muscles. Clear to auscultation A and P, no wheezes or crackles, normal breath sounds.    Gastrointestinal: rounded, soft. Positive bowel sounds x4, nontender, no masses.   Musculoskeletal: full range of motion, no edema.   Skin: pink, turgor smooth and elastic. Negative for lesions or dryness.  Neurological: normal gait, no tremor.   Psychological: appropriate mood and affect.   Lymphatic: no cervical, axillary, supraclavicular, or infraclavicular lymphadenopathy.          Signed Electronically by: PRAFUL Acosta CNP

## 2024-09-26 ENCOUNTER — MYC REFILL (OUTPATIENT)
Dept: PEDIATRICS | Facility: CLINIC | Age: 29
End: 2024-09-26
Payer: COMMERCIAL

## 2024-09-26 DIAGNOSIS — F51.01 PRIMARY INSOMNIA: ICD-10-CM

## 2024-09-27 RX ORDER — CLONIDINE HYDROCHLORIDE 0.3 MG/1
0.3 TABLET ORAL DAILY
Qty: 90 TABLET | Refills: 3 | Status: SHIPPED | OUTPATIENT
Start: 2024-09-27

## 2025-05-22 ENCOUNTER — MYC MEDICAL ADVICE (OUTPATIENT)
Dept: PEDIATRICS | Facility: CLINIC | Age: 30
End: 2025-05-22
Payer: COMMERCIAL